# Patient Record
Sex: MALE | Race: WHITE
[De-identification: names, ages, dates, MRNs, and addresses within clinical notes are randomized per-mention and may not be internally consistent; named-entity substitution may affect disease eponyms.]

---

## 2017-10-04 ENCOUNTER — HOSPITAL ENCOUNTER (OUTPATIENT)
Dept: HOSPITAL 84 - D.LABREF | Age: 62
Discharge: HOME | End: 2017-10-04
Attending: ORTHOPAEDIC SURGERY
Payer: MEDICARE

## 2017-10-04 VITALS — BODY MASS INDEX: 39.3 KG/M2

## 2017-10-04 DIAGNOSIS — Z11.8: ICD-10-CM

## 2017-10-04 DIAGNOSIS — M17.11: Primary | ICD-10-CM

## 2017-10-14 ENCOUNTER — HOSPITAL ENCOUNTER (EMERGENCY)
Dept: HOSPITAL 84 - D.ER | Age: 62
LOS: 1 days | Discharge: HOME | End: 2017-10-15
Payer: MEDICARE

## 2017-10-14 VITALS — BODY MASS INDEX: 39.3 KG/M2

## 2017-10-14 DIAGNOSIS — F17.200: ICD-10-CM

## 2017-10-14 DIAGNOSIS — T67.5XXA: Primary | ICD-10-CM

## 2017-10-14 DIAGNOSIS — R42: ICD-10-CM

## 2017-10-14 DIAGNOSIS — I10: ICD-10-CM

## 2017-10-14 DIAGNOSIS — E11.9: ICD-10-CM
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
## 2017-10-14 DIAGNOSIS — H40.9: ICD-10-CM

## 2017-10-14 DIAGNOSIS — Z79.4: ICD-10-CM

## 2017-10-14 DIAGNOSIS — F10.129: ICD-10-CM

## 2017-10-14 DIAGNOSIS — K21.9: ICD-10-CM

## 2017-10-14 DIAGNOSIS — I45.10: ICD-10-CM

## 2017-10-14 LAB
ALBUMIN SERPL-MCNC: 3.9 G/DL (ref 3.4–5)
ALP SERPL-CCNC: 53 U/L (ref 46–116)
ALT SERPL-CCNC: 65 U/L (ref 10–68)
AMPHETAMINES UR QL SCN: NEGATIVE QUAL
ANION GAP SERPL CALC-SCNC: 18.7 MMOL/L (ref 8–16)
APPEARANCE UR: CLEAR
BARBITURATES UR QL SCN: NEGATIVE QUAL
BASOPHILS NFR BLD AUTO: 0.1 % (ref 0–2)
BENZODIAZ UR QL SCN: NEGATIVE QUAL
BILIRUB SERPL-MCNC: 0.26 MG/DL (ref 0.2–1.3)
BILIRUB SERPL-MCNC: NEGATIVE MG/DL
BUN SERPL-MCNC: 10 MG/DL (ref 7–18)
BZE UR QL SCN: NEGATIVE QUAL
CALCIUM SERPL-MCNC: 8.6 MG/DL (ref 8.5–10.1)
CANNABINOIDS UR QL SCN: NEGATIVE QUAL
CHLORIDE SERPL-SCNC: 96 MMOL/L (ref 98–107)
CK MB SERPL-MCNC: 5.9 U/L (ref 0–3.6)
CK SERPL-CCNC: 636 UL (ref 21–232)
CO2 SERPL-SCNC: 25.1 MMOL/L (ref 21–32)
COLOR UR: YELLOW
CREAT SERPL-MCNC: 1 MG/DL (ref 0.6–1.3)
EOSINOPHIL NFR BLD: 0.2 % (ref 0–7)
ERYTHROCYTE [DISTWIDTH] IN BLOOD BY AUTOMATED COUNT: 13.2 % (ref 11.5–14.5)
ETHANOL SERPL-MCNC: 131 MG/DL (ref 0–10)
GLOBULIN SER-MCNC: 3.1 G/L
GLUCOSE SERPL-MCNC: 100 MG/DL
GLUCOSE SERPL-MCNC: 199 MG/DL (ref 74–106)
HCT VFR BLD CALC: 38.5 % (ref 42–54)
HGB BLD-MCNC: 13.2 G/DL (ref 13.5–17.5)
IMM GRANULOCYTES NFR BLD: 0.7 % (ref 0–5)
KETONES UR STRIP-MCNC: (no result) MG/DL
LYMPHOCYTES NFR BLD AUTO: 16.6 % (ref 15–50)
MCH RBC QN AUTO: 31.5 PG (ref 26–34)
MCHC RBC AUTO-ENTMCNC: 34.3 G/DL (ref 31–37)
MCV RBC: 91.9 FL (ref 80–100)
MONOCYTES NFR BLD: 4.5 % (ref 2–11)
NEUTROPHILS NFR BLD AUTO: 77.9 % (ref 40–80)
NITRITE UR-MCNC: NEGATIVE MG/ML
OPIATES UR QL SCN: NEGATIVE QUAL
OSMOLALITY SERPL CALC.SUM OF ELEC: 276 MOSM/KG (ref 275–300)
PCP UR QL SCN: NEGATIVE QUAL
PH UR STRIP: 5 [PH] (ref 5–6)
PLATELET # BLD: 217 10X3/UL (ref 130–400)
PMV BLD AUTO: 9.7 FL (ref 7.4–10.4)
POTASSIUM SERPL-SCNC: 3.8 MMOL/L (ref 3.5–5.1)
PROT SERPL-MCNC: 7 G/DL (ref 6.4–8.2)
PROT UR-MCNC: NEGATIVE MG/DL
RBC # BLD AUTO: 4.19 10X6/UL (ref 4.2–6.1)
SODIUM SERPL-SCNC: 136 MMOL/L (ref 136–145)
SP GR UR STRIP: 1.02 (ref 1–1.02)
TROPONIN I SERPL-MCNC: < 0.017 NG/ML (ref 0–0.06)
UROBILINOGEN UR-MCNC: NORMAL MG/DL
WBC # BLD AUTO: 8.7 10X3/UL (ref 4.8–10.8)

## 2017-11-22 ENCOUNTER — HOSPITAL ENCOUNTER (INPATIENT)
Dept: HOSPITAL 84 - D.SDCHOLD | Age: 62
LOS: 8 days | Discharge: HOME | DRG: 470 | End: 2017-11-30
Attending: ORTHOPAEDIC SURGERY | Admitting: ORTHOPAEDIC SURGERY
Payer: MEDICARE

## 2017-11-22 VITALS — HEIGHT: 68 IN | WEIGHT: 258.54 LBS | BODY MASS INDEX: 39.18 KG/M2

## 2017-11-22 DIAGNOSIS — G47.30: ICD-10-CM

## 2017-11-22 DIAGNOSIS — D62: ICD-10-CM

## 2017-11-22 DIAGNOSIS — M17.11: Primary | ICD-10-CM

## 2017-11-22 DIAGNOSIS — I10: ICD-10-CM

## 2017-11-22 DIAGNOSIS — E66.9: ICD-10-CM

## 2017-11-22 DIAGNOSIS — E11.9: ICD-10-CM

## 2017-11-22 LAB
ANION GAP SERPL CALC-SCNC: 11.8 MMOL/L (ref 8–16)
APPEARANCE UR: CLEAR
APTT BLD: 23.8 SECONDS (ref 22.8–39.4)
BASOPHILS NFR BLD AUTO: 0.2 % (ref 0–2)
BILIRUB SERPL-MCNC: NEGATIVE MG/DL
BUN SERPL-MCNC: 10 MG/DL (ref 7–18)
CALCIUM SERPL-MCNC: 8.9 MG/DL (ref 8.5–10.1)
CHLORIDE SERPL-SCNC: 101 MMOL/L (ref 98–107)
CO2 SERPL-SCNC: 29 MMOL/L (ref 21–32)
COLOR UR: YELLOW
CREAT SERPL-MCNC: 1 MG/DL (ref 0.6–1.3)
EOSINOPHIL NFR BLD: 1.7 % (ref 0–7)
ERYTHROCYTE [DISTWIDTH] IN BLOOD BY AUTOMATED COUNT: 12.5 % (ref 11.5–14.5)
GLUCOSE SERPL-MCNC: 119 MG/DL (ref 74–106)
GLUCOSE SERPL-MCNC: NEGATIVE MG/DL
HCT VFR BLD CALC: 39.3 % (ref 42–54)
HGB BLD-MCNC: 13.2 G/DL (ref 13.5–17.5)
IMM GRANULOCYTES NFR BLD: 0.2 % (ref 0–5)
INR PPP: 0.93 (ref 0.85–1.17)
KETONES UR STRIP-MCNC: NEGATIVE MG/DL
LYMPHOCYTES NFR BLD AUTO: 30.1 % (ref 15–50)
MCH RBC QN AUTO: 31.3 PG (ref 26–34)
MCHC RBC AUTO-ENTMCNC: 33.6 G/DL (ref 31–37)
MCV RBC: 93.1 FL (ref 80–100)
MONOCYTES NFR BLD: 9.8 % (ref 2–11)
NEUTROPHILS NFR BLD AUTO: 58 % (ref 40–80)
NITRITE UR-MCNC: NEGATIVE MG/ML
OSMOLALITY SERPL CALC.SUM OF ELEC: 275 MOSM/KG (ref 275–300)
PH UR STRIP: 5 [PH] (ref 5–6)
PLATELET # BLD: 224 10X3/UL (ref 130–400)
PMV BLD AUTO: 10.1 FL (ref 7.4–10.4)
POTASSIUM SERPL-SCNC: 3.8 MMOL/L (ref 3.5–5.1)
PROT UR-MCNC: NEGATIVE MG/DL
PROTHROMBIN TIME: 12.3 SECONDS (ref 11.6–15)
RBC # BLD AUTO: 4.22 10X6/UL (ref 4.2–6.1)
SODIUM SERPL-SCNC: 138 MMOL/L (ref 136–145)
SP GR UR STRIP: 1.01 (ref 1–1.02)
UROBILINOGEN UR-MCNC: NORMAL MG/DL
WBC # BLD AUTO: 6.5 10X3/UL (ref 4.8–10.8)

## 2017-11-27 VITALS — SYSTOLIC BLOOD PRESSURE: 134 MMHG | DIASTOLIC BLOOD PRESSURE: 72 MMHG

## 2017-11-27 VITALS — DIASTOLIC BLOOD PRESSURE: 59 MMHG | SYSTOLIC BLOOD PRESSURE: 113 MMHG

## 2017-11-27 VITALS — SYSTOLIC BLOOD PRESSURE: 112 MMHG | DIASTOLIC BLOOD PRESSURE: 68 MMHG

## 2017-11-27 VITALS — SYSTOLIC BLOOD PRESSURE: 130 MMHG | DIASTOLIC BLOOD PRESSURE: 90 MMHG

## 2017-11-27 VITALS — DIASTOLIC BLOOD PRESSURE: 70 MMHG | SYSTOLIC BLOOD PRESSURE: 136 MMHG

## 2017-11-27 VITALS — DIASTOLIC BLOOD PRESSURE: 74 MMHG | SYSTOLIC BLOOD PRESSURE: 134 MMHG

## 2017-11-27 VITALS — DIASTOLIC BLOOD PRESSURE: 69 MMHG | SYSTOLIC BLOOD PRESSURE: 118 MMHG

## 2017-11-27 VITALS — DIASTOLIC BLOOD PRESSURE: 67 MMHG | SYSTOLIC BLOOD PRESSURE: 127 MMHG

## 2017-11-27 VITALS — SYSTOLIC BLOOD PRESSURE: 145 MMHG | DIASTOLIC BLOOD PRESSURE: 103 MMHG

## 2017-11-27 VITALS — DIASTOLIC BLOOD PRESSURE: 80 MMHG | SYSTOLIC BLOOD PRESSURE: 121 MMHG

## 2017-11-27 VITALS — DIASTOLIC BLOOD PRESSURE: 65 MMHG | SYSTOLIC BLOOD PRESSURE: 123 MMHG

## 2017-11-27 VITALS — DIASTOLIC BLOOD PRESSURE: 55 MMHG | SYSTOLIC BLOOD PRESSURE: 122 MMHG

## 2017-11-27 VITALS — SYSTOLIC BLOOD PRESSURE: 134 MMHG | DIASTOLIC BLOOD PRESSURE: 84 MMHG

## 2017-11-27 PROCEDURE — 0SRC0J9 REPLACEMENT OF RIGHT KNEE JOINT WITH SYNTHETIC SUBSTITUTE, CEMENTED, OPEN APPROACH: ICD-10-PCS | Performed by: ORTHOPAEDIC SURGERY

## 2017-11-27 NOTE — OP
PATIENT NAME:  GRACE AMZARIEGOS                     MEDICAL RECORD: [id number]
:[birth year]                                             LOCATION:[id number]
                                                         ADMISSION DATE:17
SURGEON:  CHARLEY PACHECO MD        
 
 
DATE OF OPERATION:  2017
 
DATE OF OPERATION:  2017
 
PREOPERATIVE DIAGNOSIS:  Degenerative arthritis, right knee.
 
POSTOPERATIVE DIAGNOSIS:  Degenerative arthritis, right knee.
 
PROCEDURE:  Right total knee arthroplasty.
 
SURGEON:  Charley Pacheco MD
 
ANESTHESIA:  General.
 
INTRAOPERATIVE COMPLICATIONS:  None.
 
SUMMARY OF PATHOLOGIC FINDINGS:  The patient had extensive tricompartmental
osteoarthritis consistent with preoperative radiographs.
 
IMPLANTS USED:  Arthrex iBalance total knee arthroplasty system, size 7 distal
femur, size 6 tibial baseplate, size 6 x 9 mm polyethylene insert, size 34 x 9
patella.
 
ESTIMATED BLOOD LOSS:  50 cc.
 
OPERATIVE SUMMARY IN DETAIL:  After obtaining the appropriate preoperative
orthopedic surgery consent as well as anesthetic consultation, evaluation and
clearance, the patient was brought to the operating room and placed on the
operating table in supine position.  After general laryngeal mask was
administered, tourniquet was placed about the proximal aspect of the right lower
extremity.  Right lower extremity was prepped and draped in routine sterile
fashion.  Leg was elevated, exsanguinated and tourniquet inflated to 350 mmHg. 
Routine midline incision was taken down for paramedian arthrotomy.  Patella was
everted, distal femur was exposed.  Soft tissue excision was done in the usual
fashion.  Distal intramedullary guide hole was created for distal intramedullary
guided cut followed by resection of the distal femur and the entire proximal
tibia was exposed.  Further soft tissue resection was then followed by creation
of an intramedullary guide hole for proximal tibial intramedullary guided cut. 
Having completed this cut, trials were put into place, taken through range of
motion, thus corresponding to the above-mentioned final components.  Distal
femoral preparations were made followed by proximal tibial preparations, the
patellar articular surface was excised and prepared for final patellar
insertion.  At this point, the knee was copiously irrigated in pulsatile lavage
fashion.  At this point, the bone ends were dried.  The tibial baseplate was
cemented into place first.  Removal of all cement was then followed by placement
of the polyethylene.  Distal femur was then put into place with removal of all
cement and finally the patella was put into place again with removal of all
cement.  The knee was held in extension with a patellar clamp in place while the
cement was allowed to harden.  Having completed this, the knee was taken through
range of motion and found to be stable in all planes.  Paramedian arthrotomy was
closed with #2 Ethibond followed by #1 Vicryl, 2-0 Vicryl and skin staples. 
Sterile dressings were applied.  The tourniquet was deflated.  The patient was
 
 
 
OPERATIVE REPORT                               [id number]    GRACE MAZARIEGOS   
 
 
awakened, taken to recovery room in stable condition.  All final needle and
sponge counts were correct.
 
TRANSINT:[id number] Voice Confirmation ID: [id number] DOCUMENT ID: [id number]
                                           
                                           JUNIOR CARO, CHARLEY CAROLINA        
 
 
 
Electronically Signed by CHARLEY PACHECO MD on 17 at 1318
 
 
 
 
 
 
 
 
 
 
 
 
 
 
 
 
 
 
 
 
 
 
 
 
 
 
 
 
 
 
 
 
 
 
 
 
 
CC:                                                             [id number]
DICTATION DATE: 1752     :     17 1240      ADM IN  
                                                                              
[id number] [place] [address]

## 2017-11-27 NOTE — NUR
FSBS 132. NO COVERAGE REQUIRED. LUNCH SERVED IN ROOM. INSTRUCTED IN USE OF IS
WITH RETURN DEMONSTRATION. DENIES PAIN OR NEEDS AT THIS TIME.

## 2017-11-27 NOTE — NUR
RECEIVED TO ROOM 2209 VIA BED FROM PACU. A/O X3. WIFE IN ROOM. DRESSING TO
RIGHT KNEE DRY AND INTACT. NO C/O PAIN OR DISCOMFORT AT THIS TIME. DENIES
NEEDS.

## 2017-11-28 VITALS — DIASTOLIC BLOOD PRESSURE: 56 MMHG | SYSTOLIC BLOOD PRESSURE: 130 MMHG

## 2017-11-28 VITALS — DIASTOLIC BLOOD PRESSURE: 67 MMHG | SYSTOLIC BLOOD PRESSURE: 136 MMHG

## 2017-11-28 VITALS — SYSTOLIC BLOOD PRESSURE: 151 MMHG | DIASTOLIC BLOOD PRESSURE: 71 MMHG

## 2017-11-28 VITALS — SYSTOLIC BLOOD PRESSURE: 134 MMHG | DIASTOLIC BLOOD PRESSURE: 77 MMHG

## 2017-11-28 VITALS — SYSTOLIC BLOOD PRESSURE: 125 MMHG | DIASTOLIC BLOOD PRESSURE: 70 MMHG

## 2017-11-28 LAB
ERYTHROCYTE [DISTWIDTH] IN BLOOD BY AUTOMATED COUNT: 12.5 % (ref 11.5–14.5)
HCT VFR BLD CALC: 33.4 % (ref 42–54)
HGB BLD-MCNC: 11.3 G/DL (ref 13.5–17.5)
MCH RBC QN AUTO: 31.4 PG (ref 26–34)
MCHC RBC AUTO-ENTMCNC: 33.8 G/DL (ref 31–37)
MCV RBC: 92.8 FL (ref 80–100)
PLATELET # BLD: 203 10X3/UL (ref 130–400)
PMV BLD AUTO: 10 FL (ref 7.4–10.4)
RBC # BLD AUTO: 3.6 10X6/UL (ref 4.2–6.1)
WBC # BLD AUTO: 7.9 10X3/UL (ref 4.8–10.8)

## 2017-11-28 NOTE — NUR
PATIENTS FSBS 280. GIVEN 6 UNITS SUBQ PER SS. PATIENT HAS HOME INSULIN WITH
HIM AND STATED HE WOULD TAKE SOME MORE AFTER SUPPER. ENCOURAGED NOT TO DO SO.
WILL MONITOR.

## 2017-11-28 NOTE — NUR
AWAKE AND ALERT. ORIENTED X3. NO C/O AT THIS TIME. SITTING UP IN BED EATING
BREAKFAST. CPM REMOVED AT THIS TIME. LUNGS ARE CLEAR BILATERALLY, NO COUGH
NOTED. SKIN IS INTACT WITHOUT REDNESS EXCEPT INCISION TO RIGHT KNEE WHICH HAS
A DRY INTACT DRESSING IN PLACE. IV TO LEFT FOREARM IS PATENT WITHOUT REDNESS
AT INSERTION SITE. DENIES NEEDS. REPORTS VERY LITTLE RELIER WITH USE OF
HYDROCODONE.

## 2017-11-28 NOTE — NUR
1930)REC;D. IN BED IN ERECT SITTING POSITION PLAYNG TABLET. ACEWRAP DRESSING
DRY AND INTACT TO RT. LEG IN CPM AT PRESENT TIME. FOOT WARM STATES LEG REMAINS
NUMB. CAN FEEL BACK SIDE OF CALF. IN CPM SUZANNE. WELL. WILL CONTINUE TO MOMITOR
FOR ANY CHGES. AND FOLLOW CURRENT PLAN OF CARE.

## 2017-11-29 VITALS — DIASTOLIC BLOOD PRESSURE: 60 MMHG | SYSTOLIC BLOOD PRESSURE: 137 MMHG

## 2017-11-29 VITALS — SYSTOLIC BLOOD PRESSURE: 123 MMHG | DIASTOLIC BLOOD PRESSURE: 73 MMHG

## 2017-11-29 VITALS — DIASTOLIC BLOOD PRESSURE: 55 MMHG | SYSTOLIC BLOOD PRESSURE: 126 MMHG

## 2017-11-29 VITALS — SYSTOLIC BLOOD PRESSURE: 138 MMHG | DIASTOLIC BLOOD PRESSURE: 70 MMHG

## 2017-11-29 LAB
ERYTHROCYTE [DISTWIDTH] IN BLOOD BY AUTOMATED COUNT: 12.2 % (ref 11.5–14.5)
HCT VFR BLD CALC: 29.5 % (ref 42–54)
HGB BLD-MCNC: 10.2 G/DL (ref 13.5–17.5)
MCH RBC QN AUTO: 31.6 PG (ref 26–34)
MCHC RBC AUTO-ENTMCNC: 34.6 G/DL (ref 31–37)
MCV RBC: 91.3 FL (ref 80–100)
PLATELET # BLD: 173 10X3/UL (ref 130–400)
PMV BLD AUTO: 9.7 FL (ref 7.4–10.4)
RBC # BLD AUTO: 3.23 10X6/UL (ref 4.2–6.1)
WBC # BLD AUTO: 7.8 10X3/UL (ref 4.8–10.8)

## 2017-11-29 NOTE — NUR
PT CHECKING OWN BLOOD SUGAR AND ADMINISTERING INSULIN FROM HOME. PT ADVISED TO
LET NURSE CHECK AND ADMINISTER INSULIN.

## 2017-11-30 VITALS — DIASTOLIC BLOOD PRESSURE: 73 MMHG | SYSTOLIC BLOOD PRESSURE: 136 MMHG

## 2017-11-30 VITALS — DIASTOLIC BLOOD PRESSURE: 62 MMHG | SYSTOLIC BLOOD PRESSURE: 130 MMHG

## 2017-11-30 NOTE — NUR
DISCHARGE INSTRUCTIONS GIVEN TO PT AS ORDERED, VERBALIZED UNDERSTANDING AND
SIGNED. PT INFORMATION AND DRSG SENT WITH PT TO CHANGE AS ORDERED.
L FOREARM IV D/C'D AT THIS TIME, BLEED CONTROL, BANDAGE APPLIED. NO NEEDS
VOICED AT THIS TIME.

## 2017-11-30 NOTE — NUR
REC'D.IN BED CPM ON STATES WANTS PAIN PILL WHEN CPM REMOVED AT 2130.ACEWRAP
DRSG DRY AND INTACT TO LEFT LEG.FOOT PINK AND WARM/PEDAL PULSE PRESENT/WIGGLE
TOES DORSIFLEXES,DENIES CALF PAIN OR TENDERNESS WILL CONTONUE TO MONITOR FOR
ANY CHGES. AND FOLLOW CURRENT PLAN OF CARE.

## 2017-11-30 NOTE — NUR
PT RESTING QUIETLY. DENIES ANY NEEDS AT THIS TIME. STATES HE IS READY TO GET
SOME SLEEP. LIGHTS OFF, CALL LIGHT IN REACH. WILL CONTINUE WITH PLAN OF CARE.

## 2017-11-30 NOTE — NUR
Patient being discharged today his son will pick him up. He has a walker at
bedside. OP PT set up at [facility] and CPM machine will be delivered to his home.
CM will continue to follow and assist with discharge planning needs.

## 2017-12-11 ENCOUNTER — HOSPITAL ENCOUNTER (OUTPATIENT)
Dept: HOSPITAL 84 - D.US | Age: 62
Discharge: HOME | End: 2017-12-11
Attending: ORTHOPAEDIC SURGERY
Payer: MEDICARE

## 2017-12-11 VITALS — BODY MASS INDEX: 39.3 KG/M2

## 2017-12-11 DIAGNOSIS — M79.604: Primary | ICD-10-CM

## 2017-12-11 DIAGNOSIS — R22.41: ICD-10-CM

## 2017-12-18 ENCOUNTER — HOSPITAL ENCOUNTER (OUTPATIENT)
Dept: HOSPITAL 84 - D.CT | Age: 62
Discharge: HOME | End: 2017-12-18
Attending: ORTHOPAEDIC SURGERY
Payer: MEDICARE

## 2017-12-18 VITALS — BODY MASS INDEX: 39.3 KG/M2

## 2017-12-18 DIAGNOSIS — R22.41: Primary | ICD-10-CM

## 2018-01-29 ENCOUNTER — HOSPITAL ENCOUNTER (OUTPATIENT)
Dept: HOSPITAL 84 - D.MRI | Age: 63
Discharge: HOME | End: 2018-01-29
Attending: ORTHOPAEDIC SURGERY
Payer: MEDICARE

## 2018-01-29 VITALS — BODY MASS INDEX: 39.3 KG/M2

## 2018-01-29 DIAGNOSIS — M25.511: Primary | ICD-10-CM

## 2018-03-12 ENCOUNTER — HOSPITAL ENCOUNTER (OUTPATIENT)
Dept: HOSPITAL 84 - D.OPS | Age: 63
Discharge: HOME | End: 2018-03-12
Attending: ORTHOPAEDIC SURGERY
Payer: MEDICARE

## 2018-03-12 VITALS
WEIGHT: 231 LBS | BODY MASS INDEX: 35.01 KG/M2 | BODY MASS INDEX: 35.01 KG/M2 | HEIGHT: 68 IN | HEIGHT: 68 IN | WEIGHT: 231 LBS

## 2018-03-12 VITALS — SYSTOLIC BLOOD PRESSURE: 116 MMHG | DIASTOLIC BLOOD PRESSURE: 71 MMHG

## 2018-03-12 DIAGNOSIS — K21.9: ICD-10-CM

## 2018-03-12 DIAGNOSIS — E11.9: ICD-10-CM

## 2018-03-12 DIAGNOSIS — Z01.812: ICD-10-CM

## 2018-03-12 DIAGNOSIS — G47.30: ICD-10-CM

## 2018-03-12 DIAGNOSIS — M25.661: Primary | ICD-10-CM

## 2018-03-12 DIAGNOSIS — Z96.651: ICD-10-CM

## 2018-03-12 LAB
ANION GAP SERPL CALC-SCNC: 11.9 MMOL/L (ref 8–16)
BUN SERPL-MCNC: 14 MG/DL (ref 7–18)
CALCIUM SERPL-MCNC: 9 MG/DL (ref 8.5–10.1)
CHLORIDE SERPL-SCNC: 98 MMOL/L (ref 98–107)
CO2 SERPL-SCNC: 31.1 MMOL/L (ref 21–32)
CREAT SERPL-MCNC: 1.1 MG/DL (ref 0.6–1.3)
ERYTHROCYTE [DISTWIDTH] IN BLOOD BY AUTOMATED COUNT: 13.5 % (ref 11.5–14.5)
GLUCOSE SERPL-MCNC: 175 MG/DL (ref 74–106)
HCT VFR BLD CALC: 38.1 % (ref 42–54)
HGB BLD-MCNC: 12.6 G/DL (ref 13.5–17.5)
MCH RBC QN AUTO: 28.9 PG (ref 26–34)
MCHC RBC AUTO-ENTMCNC: 33.1 G/DL (ref 31–37)
MCV RBC: 87.4 FL (ref 80–100)
OSMOLALITY SERPL CALC.SUM OF ELEC: 278 MOSM/KG (ref 275–300)
PLATELET # BLD: 243 10X3/UL (ref 130–400)
PMV BLD AUTO: 9.4 FL (ref 7.4–10.4)
POTASSIUM SERPL-SCNC: 4 MMOL/L (ref 3.5–5.1)
RBC # BLD AUTO: 4.36 10X6/UL (ref 4.2–6.1)
SODIUM SERPL-SCNC: 137 MMOL/L (ref 136–145)
WBC # BLD AUTO: 8.5 10X3/UL (ref 4.8–10.8)

## 2018-07-30 ENCOUNTER — HOSPITAL ENCOUNTER (EMERGENCY)
Dept: HOSPITAL 84 - D.ER | Age: 63
LOS: 1 days | Discharge: HOME | End: 2018-07-31
Payer: MEDICARE

## 2018-07-30 VITALS — WEIGHT: 241.51 LBS | BODY MASS INDEX: 36.6 KG/M2 | HEIGHT: 68 IN

## 2018-07-30 DIAGNOSIS — S40.011A: Primary | ICD-10-CM
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
## 2018-07-30 DIAGNOSIS — E11.9: ICD-10-CM

## 2018-07-30 DIAGNOSIS — I10: ICD-10-CM
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
## 2018-07-30 DIAGNOSIS — F17.200: ICD-10-CM

## 2018-07-31 VITALS — SYSTOLIC BLOOD PRESSURE: 129 MMHG | DIASTOLIC BLOOD PRESSURE: 82 MMHG

## 2018-08-01 ENCOUNTER — HOSPITAL ENCOUNTER (OUTPATIENT)
Dept: HOSPITAL 84 - D.MRI | Age: 63
Discharge: HOME | End: 2018-08-01
Attending: ORTHOPAEDIC SURGERY
Payer: MEDICARE

## 2018-08-01 VITALS — BODY MASS INDEX: 36.7 KG/M2

## 2018-08-01 DIAGNOSIS — M75.121: Primary | ICD-10-CM

## 2018-08-22 LAB
ANION GAP SERPL CALC-SCNC: 9.3 MMOL/L (ref 8–16)
BUN SERPL-MCNC: 11 MG/DL (ref 7–18)
CALCIUM SERPL-MCNC: 9 MG/DL (ref 8.5–10.1)
CHLORIDE SERPL-SCNC: 99 MMOL/L (ref 98–107)
CO2 SERPL-SCNC: 33.3 MMOL/L (ref 21–32)
CREAT SERPL-MCNC: 1.1 MG/DL (ref 0.6–1.3)
ERYTHROCYTE [DISTWIDTH] IN BLOOD BY AUTOMATED COUNT: 12.5 % (ref 11.5–14.5)
GLUCOSE SERPL-MCNC: 75 MG/DL (ref 74–106)
HCT VFR BLD CALC: 39.7 % (ref 42–54)
HGB BLD-MCNC: 13.8 G/DL (ref 13.5–17.5)
MCH RBC QN AUTO: 31.6 PG (ref 26–34)
MCHC RBC AUTO-ENTMCNC: 34.8 G/DL (ref 31–37)
MCV RBC: 90.8 FL (ref 80–100)
OSMOLALITY SERPL CALC.SUM OF ELEC: 273 MOSM/KG (ref 275–300)
PLATELET # BLD: 241 10X3/UL (ref 130–400)
PMV BLD AUTO: 9.3 FL (ref 7.4–10.4)
POTASSIUM SERPL-SCNC: 3.6 MMOL/L (ref 3.5–5.1)
RBC # BLD AUTO: 4.37 10X6/UL (ref 4.2–6.1)
SODIUM SERPL-SCNC: 138 MMOL/L (ref 136–145)
WBC # BLD AUTO: 8.1 10X3/UL (ref 4.8–10.8)

## 2018-08-23 ENCOUNTER — HOSPITAL ENCOUNTER (EMERGENCY)
Dept: HOSPITAL 84 - D.ER | Age: 63
Discharge: HOME | End: 2018-08-23
Payer: MEDICARE

## 2018-08-23 ENCOUNTER — HOSPITAL ENCOUNTER (OUTPATIENT)
Dept: HOSPITAL 84 - D.OPS | Age: 63
Discharge: HOME | End: 2018-08-23
Attending: ORTHOPAEDIC SURGERY
Payer: MEDICARE

## 2018-08-23 VITALS
HEIGHT: 68 IN | BODY MASS INDEX: 36.53 KG/M2 | WEIGHT: 241 LBS | WEIGHT: 241 LBS | BODY MASS INDEX: 36.53 KG/M2 | HEIGHT: 68 IN

## 2018-08-23 VITALS — DIASTOLIC BLOOD PRESSURE: 77 MMHG | SYSTOLIC BLOOD PRESSURE: 148 MMHG

## 2018-08-23 VITALS
BODY MASS INDEX: 37.06 KG/M2 | WEIGHT: 244.51 LBS | BODY MASS INDEX: 37.06 KG/M2 | HEIGHT: 68 IN | HEIGHT: 68 IN | WEIGHT: 244.51 LBS

## 2018-08-23 VITALS — SYSTOLIC BLOOD PRESSURE: 136 MMHG | DIASTOLIC BLOOD PRESSURE: 87 MMHG

## 2018-08-23 DIAGNOSIS — F17.200: ICD-10-CM

## 2018-08-23 DIAGNOSIS — M75.111: ICD-10-CM

## 2018-08-23 DIAGNOSIS — J98.01: Primary | ICD-10-CM

## 2018-08-23 DIAGNOSIS — M75.41: Primary | ICD-10-CM

## 2018-08-23 DIAGNOSIS — Z01.812: ICD-10-CM

## 2018-08-23 DIAGNOSIS — E11.9: ICD-10-CM

## 2018-08-23 DIAGNOSIS — K21.9: ICD-10-CM

## 2018-08-23 DIAGNOSIS — M94.211: ICD-10-CM

## 2018-08-23 DIAGNOSIS — I10: ICD-10-CM

## 2018-08-23 LAB
ALBUMIN SERPL-MCNC: 3.3 G/DL (ref 3.4–5)
ALP SERPL-CCNC: 60 U/L (ref 46–116)
ALT SERPL-CCNC: 39 U/L (ref 10–68)
ANION GAP SERPL CALC-SCNC: 13.9 MMOL/L (ref 8–16)
BASOPHILS NFR BLD AUTO: 0.1 % (ref 0–2)
BILIRUB SERPL-MCNC: 0.35 MG/DL (ref 0.2–1.3)
BUN SERPL-MCNC: 14 MG/DL (ref 7–18)
CALCIUM SERPL-MCNC: 7.6 MG/DL (ref 8.5–10.1)
CHLORIDE SERPL-SCNC: 99 MMOL/L (ref 98–107)
CK MB SERPL-MCNC: 4.4 U/L (ref 0–3.6)
CK SERPL-CCNC: 442 UL (ref 21–232)
CO2 SERPL-SCNC: 25 MMOL/L (ref 21–32)
CREAT SERPL-MCNC: 1.3 MG/DL (ref 0.6–1.3)
EOSINOPHIL NFR BLD: 0.9 % (ref 0–7)
ERYTHROCYTE [DISTWIDTH] IN BLOOD BY AUTOMATED COUNT: 12.6 % (ref 11.5–14.5)
GLOBULIN SER-MCNC: 3.2 G/L
GLUCOSE SERPL-MCNC: 347 MG/DL (ref 74–106)
HCT VFR BLD CALC: 35.8 % (ref 42–54)
HGB BLD-MCNC: 12.3 G/DL (ref 13.5–17.5)
IMM GRANULOCYTES NFR BLD: 0.6 % (ref 0–5)
LYMPHOCYTES NFR BLD AUTO: 20 % (ref 15–50)
MCH RBC QN AUTO: 31.5 PG (ref 26–34)
MCHC RBC AUTO-ENTMCNC: 34.4 G/DL (ref 31–37)
MCV RBC: 91.6 FL (ref 80–100)
MONOCYTES NFR BLD: 8.6 % (ref 2–11)
NEUTROPHILS NFR BLD AUTO: 69.8 % (ref 40–80)
OSMOLALITY SERPL CALC.SUM OF ELEC: 282 MOSM/KG (ref 275–300)
PLATELET # BLD: 227 10X3/UL (ref 130–400)
PMV BLD AUTO: 9.7 FL (ref 7.4–10.4)
POTASSIUM SERPL-SCNC: 3.9 MMOL/L (ref 3.5–5.1)
PROT SERPL-MCNC: 6.5 G/DL (ref 6.4–8.2)
RBC # BLD AUTO: 3.91 10X6/UL (ref 4.2–6.1)
SODIUM SERPL-SCNC: 134 MMOL/L (ref 136–145)
TROPONIN I SERPL-MCNC: < 0.017 NG/ML (ref 0–0.06)
WBC # BLD AUTO: 10.8 10X3/UL (ref 4.8–10.8)

## 2018-10-03 ENCOUNTER — HOSPITAL ENCOUNTER (OUTPATIENT)
Dept: HOSPITAL 84 - D.US | Age: 63
Discharge: HOME | End: 2018-10-03
Attending: SURGERY
Payer: MEDICARE

## 2018-10-03 VITALS — BODY MASS INDEX: 37.1 KG/M2

## 2018-10-03 DIAGNOSIS — I83.893: Primary | ICD-10-CM

## 2018-12-13 LAB
ANION GAP SERPL CALC-SCNC: 15.2 MMOL/L (ref 8–16)
BUN SERPL-MCNC: 9 MG/DL (ref 7–18)
CALCIUM SERPL-MCNC: 8.7 MG/DL (ref 8.5–10.1)
CHLORIDE SERPL-SCNC: 99 MMOL/L (ref 98–107)
CO2 SERPL-SCNC: 27.5 MMOL/L (ref 21–32)
CREAT SERPL-MCNC: 1 MG/DL (ref 0.6–1.3)
ERYTHROCYTE [DISTWIDTH] IN BLOOD BY AUTOMATED COUNT: 12.4 % (ref 11.5–14.5)
GLUCOSE SERPL-MCNC: 116 MG/DL (ref 74–106)
HCT VFR BLD CALC: 38.2 % (ref 42–54)
HGB BLD-MCNC: 13.3 G/DL (ref 13.5–17.5)
MCH RBC QN AUTO: 31.1 PG (ref 26–34)
MCHC RBC AUTO-ENTMCNC: 34.8 G/DL (ref 31–37)
MCV RBC: 89.5 FL (ref 80–100)
OSMOLALITY SERPL CALC.SUM OF ELEC: 275 MOSM/KG (ref 275–300)
PLATELET # BLD: 203 10X3/UL (ref 130–400)
PMV BLD AUTO: 9.6 FL (ref 7.4–10.4)
POTASSIUM SERPL-SCNC: 3.7 MMOL/L (ref 3.5–5.1)
RBC # BLD AUTO: 4.27 10X6/UL (ref 4.2–6.1)
SODIUM SERPL-SCNC: 138 MMOL/L (ref 136–145)
WBC # BLD AUTO: 5.8 10X3/UL (ref 4.8–10.8)

## 2018-12-14 ENCOUNTER — HOSPITAL ENCOUNTER (OUTPATIENT)
Dept: HOSPITAL 84 - D.OPS | Age: 63
LOS: 1 days | Discharge: HOME | End: 2018-12-15
Attending: SURGERY
Payer: MEDICARE

## 2018-12-14 VITALS — DIASTOLIC BLOOD PRESSURE: 74 MMHG | SYSTOLIC BLOOD PRESSURE: 128 MMHG

## 2018-12-14 VITALS — HEIGHT: 78 IN | BODY MASS INDEX: 28.92 KG/M2 | WEIGHT: 249.92 LBS

## 2018-12-14 DIAGNOSIS — I83.93: Primary | ICD-10-CM

## 2018-12-14 DIAGNOSIS — E11.9: ICD-10-CM

## 2018-12-14 DIAGNOSIS — Z01.812: ICD-10-CM

## 2018-12-14 DIAGNOSIS — I87.2: ICD-10-CM

## 2018-12-14 NOTE — NUR
2338: PATIENT HAS BEEN TURNED TO PRONE POSITION, BILATERAL LEGS
RE-PREPPED USING HIBICLEANSE, AND REDRAPED.

## 2018-12-14 NOTE — NUR
1545  BS 98  PT STATES HE [place]ES [place]  [place] AND STARTS FEELING BAD WHEN
HIS BS DROPS BELOW 90.  SPOKE WITH DR ROSE AND RECEIVED A VERBAL
ORDER FOR 1/2
D50W

## 2018-12-15 VITALS — DIASTOLIC BLOOD PRESSURE: 68 MMHG | SYSTOLIC BLOOD PRESSURE: 112 MMHG

## 2018-12-15 VITALS — SYSTOLIC BLOOD PRESSURE: 126 MMHG | DIASTOLIC BLOOD PRESSURE: 76 MMHG

## 2018-12-15 VITALS — DIASTOLIC BLOOD PRESSURE: 67 MMHG | SYSTOLIC BLOOD PRESSURE: 133 MMHG

## 2018-12-15 NOTE — NUR
PATIENT IV REMOVED WITH CATH TIP INTACT. STATED NO LONGER USING PAIN PUMP AT
THIS TIME. PATIENT TO BED DISCHARGED. DRESSINGS TO BLE CDI. FAMILY AT BEDSIDE.
CALL LIGHT WITHIN REACH.

## 2018-12-15 NOTE — NUR
PATIENT RECIEVED DC INSTRUCTIONS AND PRESCRIPTIONS. VERBALIZED UNDERSTANDING.
NO QUESTIONS AT THIS TIME. AMBULATED OUT OF HOSPITAL WITH PERSONAL BELONGINGS
ESCORTED BY WIFE. REFUSED WC AT THIS TIME.

## 2018-12-18 NOTE — OP
PATIENT NAME:  GRACE MAZARIEGOS                     MEDICAL RECORD: [id number]
:[birth year]                                             LOCATION:[id number]          
                                                         ADMISSION DATE:        
SURGEON:  ESTEFANY ROSE MD            
 
 
DATE OF OPERATION:  2018
 
PREOPERATIVE DIAGNOSES:
1.  Symptomatic bilateral lower extremity varicosities.
2.  Venous aneurysm.
3.  Pathologic greater saphenous venous reflux, left.
4.  Pathologic bilateral lesser saphenous venous reflux.
 
POSTOPERATIVE DIAGNOSES:
1.  Symptomatic bilateral lower extremity varicosities.
2.  Venous aneurysm.
3.  Pathologic greater saphenous venous reflux, left.
4.  Pathologic bilateral lesser saphenous venous reflux.
 
PROCEDURES: 
1.  VNUS radiofrequency ablation of the left greater saphenous vein.  The length
of vein treated was 59 cm.
2.  VNUS radiofrequency ablation of the right lesser saphenous vein.  The length
of vein treated was 24 cm.
3.  Avulsion phlebectomies times 35 of the bilateral lower extremities.
 
SURGEON:  Estefany Rose MD
 
ASSISTANT:  None.
 
BLOOD LOSS:  200 cc.
 
ANESTHESIA:  General.
 
COMPLICATIONS:  None.
 
I reviewed the patient's venous reflux examination prior to the procedure.  I
also marked the patient's varicose veins with him standing.  He confirmed that
all the varicose veins had been marked by my magic marker.
 
OPERATIVE COURSE:  The patient was conveyed to the operating room electively on
2018.  General anesthesia was induced by the anesthesia staff.  The
patient was positioned supine.  I interrogated the left greater saphenous vein
at the ankle.  This was percutaneously accessed in an antegrade fashion.  A
guidewire passed easily.  A small skin nick was accomplished.  A 7-Chinese
dilator sheath was advanced.  The dilator and wire were removed.  Through the
sheath, I advanced the radiofrequency catheter.  Under ultrasonographic
guidance, it was advanced to the saphenofemoral junction.  I then withdrew it 2
cm into the greater saphenous vein.
 
I then positioned the patient in the Trendelenburg position.  Under ultrasound,
I confirmed that the tip of the radiofrequency catheter had not moved.  Under
ultrasonographic guidance, I then injected a crystalloid solution around the
vein to act as a heat sink to prevent damage to nearby structures such as
nerves.  The radiofrequency catheter was activated twice while holding pressure
against it from the outside.  With each 7 cm pullback, it was activated again. 
I then removed the radiofrequency catheter.  The length of the vein treated is
 
 
 
OPERATIVE REPORT                               [id number]    GRACE MAZARIEGOS   
 
 
listed above.
 
I then positioned the patient in the reverse Trendelenburg position.  I held
pressure over the saphenofemoral junction.  I then injected a foamed sclerosant.
 I kept pressure over the greater saphenous vein in order to keep it collapsed
so that the foamed sclerosant would have a longer contact time with the wall of
the greater saphenous vein.
 
The sheath was removed.  The puncture site was closed with a single 4-0 Vicryl
Rapide suture.
 
With the patient still supine, but in the Trendelenburg position, small skin
nicks were accomplished over the varicose veins.  Portions of the varicose veins
were removed utilizing the avulsion phlebectomy technique with a phlebectomy
hook.  At no time was there any apparent nervous injury. 
 
The patient was then flipped over and placed prone.  Both lower extremities were
sterilely prepped and draped.  I interrogated the left lesser saphenous vein and
it was small and I was able to inject it with a foamed sclerosant which appeared
to have done the job with regard to getting the vein to ablate.  I held pressure
over the saphenopopliteal junction after injecting the foamed sclerosant.  On
the right side, the lesser saphenous vein was larger and compressible.  I was
able to percutaneously access it in an antegrade fashion.  A guidewire passed
easily.  A small skin nick was accomplished.  A 7-Chinese dilator sheath was
advanced.  The dilator and wire were removed.  Through the sheath, I advanced a
radiofrequency catheter.  I advanced the tip to the saphenopopliteal junction
and then withdrew it about 2 cm into the lesser saphenous vein.  Under
ultrasonographic guidance, I injected a crystalloid solution into the
perivenular tissues to act as a heat sink to prevent damage to surrounding
structures.
 
I then positioned the patient in Trendelenburg position.  I confirmed with
ultrasound that the tip of the catheter had not moved.  The catheter was
activated with pressure held against the vein from outside on the patient's
skin.  With each 7 cm pullback, it was activated again.  The radiofrequency
catheter was removed.  Through the sheath, I injected a foamed sclerosant and I
compressed at the saphenopopliteal junction to keep the foamed sclerosant from
going into the deep venous system and to prolong the amount of contact time
between the foamed sclerosant and the lesser saphenous vein.
 
The remaining varicosities were noted due to their markings.  Small skin nicks
were accomplished.  Through the skin nicks, avulsion phlebectomies were
performed.
 
The sheath puncture site was closed with a single 4-0 Vicryl Rapide suture. 
Sterile dressings were applied including a circumferential Coban.  The patient
was then extubated and conveyed to post-anesthesia care unit where he was in
stable condition.
 
TRANSINT:[id number] Voice Confirmation ID: [id number] DOCUMENT ID: [id number]
2018 Edited for transcription error, dmdi.
 
 
 
OPERATIVE REPORT                               [id number]    GRACE MAZARIEGOS, ESTEFANY CARO            
 
 
 
Electronically Signed by ESTEFANY ROSE on 18 at 1522
 
 
 
 
 
 
 
 
 
 
 
 
 
 
 
 
 
 
 
 
 
 
 
 
 
 
 
 
 
 
 
 
 
 
 
 
 
 
 
 
 
CC: RADHA ARCHULETA DO                                         [id number]
DICTATION DATE: 12/15/18 035     :     12/15/18 0531      [facility] 
                                                                      12/15/18
[facility]                                          
[address] [place] [address]

## 2019-03-28 ENCOUNTER — HOSPITAL ENCOUNTER (EMERGENCY)
Dept: HOSPITAL 84 - D.ER | Age: 64
Discharge: HOME | End: 2019-03-28
Payer: MEDICARE

## 2019-03-28 VITALS
HEIGHT: 78 IN | HEIGHT: 78 IN | WEIGHT: 260 LBS | BODY MASS INDEX: 30.08 KG/M2 | WEIGHT: 260 LBS | BODY MASS INDEX: 30.08 KG/M2

## 2019-03-28 VITALS — SYSTOLIC BLOOD PRESSURE: 140 MMHG | DIASTOLIC BLOOD PRESSURE: 92 MMHG

## 2019-03-28 DIAGNOSIS — I10: ICD-10-CM

## 2019-03-28 DIAGNOSIS — E78.5: ICD-10-CM

## 2019-03-28 DIAGNOSIS — K92.2: Primary | ICD-10-CM

## 2019-03-28 DIAGNOSIS — E11.40: ICD-10-CM

## 2019-03-28 DIAGNOSIS — K21.9: ICD-10-CM

## 2019-03-28 DIAGNOSIS — F17.220: ICD-10-CM

## 2019-03-28 DIAGNOSIS — K76.0: ICD-10-CM

## 2019-03-28 LAB
ALBUMIN SERPL-MCNC: 3.5 G/DL (ref 3.4–5)
ALP SERPL-CCNC: 57 U/L (ref 46–116)
ALT SERPL-CCNC: 79 U/L (ref 10–68)
ANION GAP SERPL CALC-SCNC: 12.6 MMOL/L (ref 8–16)
BASOPHILS NFR BLD AUTO: 0.3 % (ref 0–2)
BILIRUB SERPL-MCNC: 0.3 MG/DL (ref 0.2–1.3)
BUN SERPL-MCNC: 7 MG/DL (ref 7–18)
CALCIUM SERPL-MCNC: 8.8 MG/DL (ref 8.5–10.1)
CHLORIDE SERPL-SCNC: 98 MMOL/L (ref 98–107)
CO2 SERPL-SCNC: 30.6 MMOL/L (ref 21–32)
CREAT SERPL-MCNC: 1 MG/DL (ref 0.6–1.3)
EOSINOPHIL NFR BLD: 1.4 % (ref 0–7)
ERYTHROCYTE [DISTWIDTH] IN BLOOD BY AUTOMATED COUNT: 12.9 % (ref 11.5–14.5)
GLOBULIN SER-MCNC: 3.6 G/L
GLUCOSE SERPL-MCNC: 205 MG/DL (ref 74–106)
HCT VFR BLD CALC: 38 % (ref 42–54)
HGB BLD-MCNC: 12.9 G/DL (ref 13.5–17.5)
IMM GRANULOCYTES NFR BLD: 0.5 % (ref 0–5)
LYMPHOCYTES NFR BLD AUTO: 38.4 % (ref 15–50)
MCH RBC QN AUTO: 31.2 PG (ref 26–34)
MCHC RBC AUTO-ENTMCNC: 33.9 G/DL (ref 31–37)
MCV RBC: 91.8 FL (ref 80–100)
MONOCYTES NFR BLD: 6.9 % (ref 2–11)
NEUTROPHILS NFR BLD AUTO: 52.5 % (ref 40–80)
OSMOLALITY SERPL CALC.SUM OF ELEC: 277 MOSM/KG (ref 275–300)
PLATELET # BLD: 210 10X3/UL (ref 130–400)
PMV BLD AUTO: 9.8 FL (ref 7.4–10.4)
POTASSIUM SERPL-SCNC: 4.2 MMOL/L (ref 3.5–5.1)
PROT SERPL-MCNC: 7.1 G/DL (ref 6.4–8.2)
RBC # BLD AUTO: 4.14 10X6/UL (ref 4.2–6.1)
SODIUM SERPL-SCNC: 137 MMOL/L (ref 136–145)
WBC # BLD AUTO: 5.8 10X3/UL (ref 4.8–10.8)

## 2019-04-19 ENCOUNTER — HOSPITAL ENCOUNTER (EMERGENCY)
Dept: HOSPITAL 84 - D.ER | Age: 64
Discharge: HOME | End: 2019-04-19
Payer: MEDICARE

## 2019-04-19 VITALS — BODY MASS INDEX: 39.49 KG/M2 | HEIGHT: 68 IN | WEIGHT: 260.55 LBS

## 2019-04-19 VITALS — DIASTOLIC BLOOD PRESSURE: 75 MMHG | SYSTOLIC BLOOD PRESSURE: 134 MMHG

## 2019-04-19 DIAGNOSIS — T78.49XA: Primary | ICD-10-CM
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
## 2019-08-31 ENCOUNTER — HOSPITAL ENCOUNTER (EMERGENCY)
Dept: HOSPITAL 84 - D.ER | Age: 64
Discharge: HOME | End: 2019-08-31
Payer: MEDICARE

## 2019-08-31 VITALS — SYSTOLIC BLOOD PRESSURE: 136 MMHG | DIASTOLIC BLOOD PRESSURE: 78 MMHG

## 2019-08-31 VITALS — HEIGHT: 68 IN | BODY MASS INDEX: 39.49 KG/M2 | WEIGHT: 260.55 LBS

## 2019-08-31 DIAGNOSIS — L03.115: Primary | ICD-10-CM

## 2020-01-13 ENCOUNTER — HOSPITAL ENCOUNTER (OUTPATIENT)
Dept: HOSPITAL 84 - D.HCCARDIO | Age: 65
Discharge: HOME | End: 2020-01-13
Attending: INTERNAL MEDICINE
Payer: MEDICARE

## 2020-01-13 VITALS — BODY MASS INDEX: 39.6 KG/M2

## 2020-01-13 DIAGNOSIS — I20.9: Primary | ICD-10-CM

## 2020-01-14 ENCOUNTER — HOSPITAL ENCOUNTER (OUTPATIENT)
Dept: HOSPITAL 84 - D.US | Age: 65
Discharge: HOME | End: 2020-01-14
Attending: INTERNAL MEDICINE
Payer: MEDICARE

## 2020-01-14 VITALS — BODY MASS INDEX: 39.6 KG/M2

## 2020-01-14 DIAGNOSIS — R09.89: Primary | ICD-10-CM

## 2020-01-20 ENCOUNTER — HOSPITAL ENCOUNTER (OUTPATIENT)
Dept: HOSPITAL 84 - D.CATH | Age: 65
Discharge: HOME | End: 2020-01-20
Attending: INTERNAL MEDICINE
Payer: MEDICARE

## 2020-01-20 VITALS — BODY MASS INDEX: 39.49 KG/M2 | HEIGHT: 68 IN | WEIGHT: 260.55 LBS

## 2020-01-20 VITALS — DIASTOLIC BLOOD PRESSURE: 82 MMHG | SYSTOLIC BLOOD PRESSURE: 138 MMHG

## 2020-01-20 DIAGNOSIS — I20.9: Primary | ICD-10-CM

## 2020-01-20 DIAGNOSIS — E78.5: ICD-10-CM

## 2020-01-20 DIAGNOSIS — Z79.84: ICD-10-CM

## 2020-01-20 DIAGNOSIS — E11.9: ICD-10-CM

## 2020-01-20 DIAGNOSIS — I10: ICD-10-CM

## 2020-01-20 LAB
ANION GAP SERPL CALC-SCNC: 12.7 MMOL/L (ref 8–16)
BASOPHILS NFR BLD AUTO: 0.2 % (ref 0–2)
BUN SERPL-MCNC: 9 MG/DL (ref 7–18)
CALCIUM SERPL-MCNC: 8.5 MG/DL (ref 8.5–10.1)
CHLORIDE SERPL-SCNC: 98 MMOL/L (ref 98–107)
CO2 SERPL-SCNC: 29.9 MMOL/L (ref 21–32)
CREAT SERPL-MCNC: 1 MG/DL (ref 0.6–1.3)
EOSINOPHIL NFR BLD: 1.1 % (ref 0–7)
ERYTHROCYTE [DISTWIDTH] IN BLOOD BY AUTOMATED COUNT: 12.6 % (ref 11.5–14.5)
GLUCOSE SERPL-MCNC: 178 MG/DL (ref 74–106)
HCT VFR BLD CALC: 39.1 % (ref 42–54)
HGB BLD-MCNC: 13.5 G/DL (ref 13.5–17.5)
IMM GRANULOCYTES NFR BLD: 0.5 % (ref 0–5)
LYMPHOCYTES NFR BLD AUTO: 34.1 % (ref 15–50)
MCH RBC QN AUTO: 33.1 PG (ref 26–34)
MCHC RBC AUTO-ENTMCNC: 34.5 G/DL (ref 31–37)
MCV RBC: 95.8 FL (ref 80–100)
MONOCYTES NFR BLD: 8.9 % (ref 2–11)
NEUTROPHILS NFR BLD AUTO: 55.2 % (ref 40–80)
OSMOLALITY SERPL CALC.SUM OF ELEC: 276 MOSM/KG (ref 275–300)
PLATELET # BLD: 188 10X3/UL (ref 130–400)
PMV BLD AUTO: 9.5 FL (ref 7.4–10.4)
POTASSIUM SERPL-SCNC: 3.6 MMOL/L (ref 3.5–5.1)
RBC # BLD AUTO: 4.08 10X6/UL (ref 4.2–6.1)
SODIUM SERPL-SCNC: 137 MMOL/L (ref 136–145)
WBC # BLD AUTO: 6.3 10X3/UL (ref 4.8–10.8)

## 2020-01-20 NOTE — NUR
4cc OF AIR REMOVED FROM TR BAND. NO BLEEDING/HEMATOMA NOTED. CALL
LIGHT WITHIN REACH. VSS. FAMILY AT BEDSIDE.

## 2020-01-20 NOTE — NUR
RIGHT WRIST TR BAND REMOVED. DRESSING APPLIED. NO BLEEDING/HEMATOMA
NOTED. RIGHT WRIST BRACE IN PLACE.

## 2020-01-20 NOTE — NUR
2cc OF AIR REMOVED FROM TR BAND. NO BLEEDING/HEMATOMA NOTED. CALL
LIGHT WITHIN REACH. FAMILY AT BEDSIDE. VSS.

## 2020-01-20 NOTE — HEMODYNAMI
PATIENT:GRACE MAZARIEGOS                            MEDICAL RECORD: [id number]
: [birth year]                                            LOCATION:D.CAT          
ACCT# [id number]                                       ADMISSION DATE: 20
 
 
 Generatedon:202014:17
Patient name: GRACE MAZARIEGOS Patient #: [id number] Visit #: [id number] SSN: YOB: 1955
Date of study: 2020
Page: Of
Hemodynamic Procedure Report
****************************
Patient Data
Patient Demographics
Procedure consent was obtained
First Name: GRACE         Gender: Male
Last Name: DELILAH          : 1955
Middle Initial: CARTER       Age: 64 year(s)
Patient #: [id number]       Race: Unknown
Visit #: [id number]
Accession #:
[id number]
Additional ID: [id number]
Contact details
Address: [address] [place] [address]   Phone: [phone]
State: [place]
City: [place]
Zip code: [address]
Admission
Admission Data
Admission Date: 2020   Admission Time: 11:24
Arrival Date: 2020     Arrival Time: 0:00
Insurance Payor: Medicare,
Private health insurance
Height (in.): 67.72         BSA: 2.28 (m2)
Height (cm.): 172           BMI: 39.89 (kg/m2)
Weight (lbs.): 260.15
Weight (kg.): 118
Lab Results
Lab Result Date: 2020  Lab Result Time: 0:00
Biochemistry
Name         Units    Result                Min      Max
BUN          mg/dl    9        --(*---)--   7        18
Creatinine   mg/dl    1        --(--*-)--   0.6      1.3
CBC
Name         Units    Result                Min      Max
Hemoglobin   g/dl     13.5     --(*---)--   13.5     17.5
Procedure
Procedure Types
Cath Procedure
Diagnostic Procedure
C
[facility] w/Coronaries
Sedation Charges
Moderate Sedation up to 15 minutes
Procedure Description
Procedure Date
Procedure Date: 2020
Procedure Start Time: 14:04
 
Procedure End Time: 14:13
Procedure Staff
Name                            Function
Colt Guevara MD              Performing Physician
Angelique Wing RT                    Scrub
Joce Thompson RN              Nurse
Chhaya Velásquez RT               Monitor
Procedure Data
Cath Procedure
Fluoroscopy
Diagnostic fluoroscopy      Total fluoroscopy Time: 2.3
time: 2.3 min               min
Diagnostic fluoroscopy      Total fluoroscopy dose: 683
dose: 683 mGy               mGy
Contrast Material
Contrast Material Type                       Amount (ml)
Isovue 300                                   55
Entry Location
Entry     Primary  Successful  Side  Size  Upsize Upsize Entry    Closure     Garcia
ccessful  Closure
Location                             (Fr)  1 (Fr) 2 (Fr) Remarks  Device        
          Remarks
Radial                         Right 6 Fr                         Mechanical
artery                               Short                        Compression
Estimated blood loss: 10 ml
Diagnostic catheters
Device Type               Used For           End Catheter
Placement
DIAGNOSTIC [place] 110cm 5  Procedure
Fr catheter (420922)
Procedure Complications
No complications
Procedure Medications
Medication           Administration Route Dosage
0.9% NaCl            I.V.                 100 ml/hr
Oxygen               etCO2 Nasal cannula  2 l/min
Heparin Flush Bag    added to field       2 bags
(1000units/500ml NS)
Lidocaine 2%         added to field       20
Radial Cocktail      added to field       1 syringe
(Verapamil 2mg/Nitro
400mcg/Heparin
1500units)
Versed               I.V.                 2 mg
Fentanyl             I.V.                 100 mcg
Versed               I.V.                 2 mg
Hemodynamics
Rest
BSA: 2.28 (m2) HGB: 13.5 (g/dl) O2 Consumption: Estimated: 271.79 (ml/min) O2 Co
nsumption indexed:
Estimated:119.21 (ml/min/m) Heart Rate: 76 (bpm)
Pressure Samples
Time     Site     Value (mmHg) Purpose      Heart      Use
Rate(bpm)
14:06    LV       130/-3,5     Snapshot     72
14:07    AO       108/70(87)   Pullback     98
14:07    LV       106/0,-1     Pullback     98
Gradients
Valve  Time  Site 1   Site 2     Mean    SEP/DFP    Peak To Heart  Use
(mmHg)  (sec/min)  Peak    Rate
 
(mmHg)  (bpm)
Aortic 14:07 LV       AO         0       5          0       98
106/0,-1 108/70(87)
Calculations
Valve    P-P      Mean      Valve     Index  Valve    Source
Name              Gradient  Area             Flow
(cm2)
Aortic   0        0
0        0
Snapshots
Pre Cath      Intra         NCS           Post Cath
Vital Signs
Time     Heart  Resp   SPO2 etCO2   NIBP (mmHg)  Rhythm  Pain    Sedation
Rate   (ipm)  (%)  (mmHg)                       Status  Level
(bpm)
13:53:13 77     18     100  35.8    147/88(102)  NSR     0 (11)  10(A)
, No
pain
13:57:18 87     14     95   27.4    142/85(114)  NSR     0 (11)  10(A)
, No
pain
14:02:26 90     11     95   42.7    146/107(126) NSR     0 (11)  10(A)
, No
pain
14:06:33 93     15     93   19      144/91(126)  NSR     0 (11)  10(A)
, No
pain
14:10:43 96     12     94   11.4    137/90(107)  NSR     0 (11)  9(A)
, No
pain
14:13:09 90     11     96   34.3    123/88(118)  NSR     0 (11)  10(A)
, No
pain
Medications
Time     Medication       Route   Dose    Verified Delivered Reason     Notes  E
ffectiveness
by       by
13:52:41 0.9% NaCl        I.V.    100     Joce  Joce   Per
ml/hr   Jay Thompson   physician
RN       RN
13:52:50 Oxygen           etCO2   2 l/min Joce  Joce   for low 02
Nasal           Lorigan  Lorigan   sats
cannula         RN       RN
13:53:03 Heparin Flush    added   2 bags  Joce  Joce   used for
Bag              to              Lorigan  Lorigan   procedure
(1000units/500ml field           RN       RN
NS)
13:53:14 Lidocaine 2%     added   20ml    Joce  Joce   for local
to      vial    Lorigan  Lorigan   anesthetic
field           RN       RN
13:53:24 Radial Cocktail  added   1       Joce  Joce   used for
(Verapamil       to      syringe Lorigan  Lorigan   procedure
2mg/Nitro        field           RN       RN
400mcg/Heparin
1500units)
14:03:31 Versed           I.V.    2 mg    Joce  Joce   for
Lorigan  Lorigan   sedation
RN       RN
14:03:39 Fentanyl         I.V.    100 mcg Joce  Joce   for
Lorigan  Lorigan   sedation
 
RN       RN
14:06:59 Versed           I.V.    2 mg    Joce  Joce   for
Lorigan  Lorigan   sedation
RN       RN
Procedure Log
Time     Note
13:39:44 Informed consent obtained and on chart
13:39:46 Diagnostic Cath Status : Elective
13:41:03 Joce Thompson RN sent for patient. Start room use.
13:41:04 Time tracking: Regular hours (M-F 7:00 - 5:00)
13:41:07 Plan of Care:Hemodynamics will remain stable., Cardiac
rhythm will remain stable., Comfort level will be
maintained., Respiratory function will remain
adequate., Patient/ family verbilizes understanding of
procedure., Procedure tolerated without complication.,
Recovers from procedure without complications..
13:43:42 Patient received from Pre/Post Procedure Room to CCL 2
Alert and oriented. Tansferred to table in Supine
position.
13:43:43 Warm blankets applied, and gretta hugger turned on for
patient comfort.
13:43:43 Correct patient and procedure confirmed by team.
13:43:44 ECG and BP/O2 sat monitors applied to patient.
13:43:52 Stress Test: yes; abnormal 57
13:43:57 Risk of Mortality: 0.1
13:43:59 Risk of blood transfusion: 0.1
13:44:02 Risk of JOSÉ LUIS: 0.2
13:47:25 H&P Date Dictated: 2020 Within 30 days and on
chart., H&P Addendum completed by physician on day of
procedure. (MUST COMPLETE FOR ALL OUTPATIENTS).
13:47:26 Pre-procedure instructions explained to patient.
13:47:26 Pre-op teaching completed and patient verbalized
understanding.
13:47:31 Family in patients room.
13:47:32 Patient NPO since Midnight.
13:47:34 Is the patient allergic to Iodine/contrast media? No.
13:47:36 Is patient on blood thinner?No
13:47:37 Patient diabetic? Yes.
13:47:38 If diabetic: On Metformin? Yes
13:47:40 If on Metformin: Last Dose? 2020
13:47:43 Previous problem with sedation/anesthesia? No ?
13:47:44 Snore? Yes
13:47:45 Sleep apnea? Yes
13:47:46 Deviated septum? No
13:47:47 Opens mouth fully? Yes
13:47:48 Sticks out tongue? Yes
13:47:50 Airway obstruction? No ?
13:47:53 Dentures? No ?
13:47:57 Pre procedure: right dorsailis pedis pulse 1+
Palpable, but thready & weak; easily obliterated
13:47:59 Modified Joseph's test Ulnar < 7 seconds
13:48:03 Patient pain scale 0/10 ?.
13:48:15 IV patent on arrival in left forearm with 0.9% NaCl at
O.
13:49:20 Lab results completed and on chart.
13:49:43 Lab Result : BUN 9 mg/dl
13:49:43 Lab Result : Creatinine 1 mg/dl
13:49:43 Lab Result : Hemoglobin 13.5 g/dl
13:49:52 Right Radial & Right Groin area was prepped with
chlora-prep and draped in sterile fashion
 
13:49:53 Alarms reviewed by R. N.
13:49:54 Sharps counted by scrub and verified by R.N.
13:50:09 2) 60-89 Mildly reduced kidney function, and other
findings (as for stage 1) point to kidney disease.
13:51:23 Maximum allowable contrast dose (3.7 X eGFR X 0.75)222
ml.
13:52:02 Vital chart was started
13:52:08 Baseline sample Acquired.
13:52:24 Rhythm: sinus rhythm
13:52:26 Full Disclosure recording started
13:52:41 0.9% NaCl 100 ml/hr I.V. was administered by Joce Thompson RN; Per physician; Verbal order read back and
verified.
13:52:50 Oxygen 2 l/min etCO2 Nasal cannula was administered by
Joce Thompson RN; for low 02 sats; Verbal order read
back and verified.
13:53:03 Heparin Flush Bag (1000units/500ml NS) 2 bags added to
field was administered by Joce Thompson RN; used for
procedure; Verbal order read back and verified.
13:53:14 Lidocaine 2% 20ml vial added to field was administered
by Joce Thompson RN; for local anesthetic; Verbal
order read back and verified.
13:53:24 Radial Cocktail (Verapamil 2mg/Nitro 400mcg/Heparin
1500units) 1 syringe added to field was administered
by Joce Thompson RN; used for procedure; Verbal
order read back and verified.
13:53:29 Use device set Radial Dx or PCI
13:54:08 ACIST Syringe (76720) opened to sterile field.
13:54:09 Medline Cath Pack ([id number]) opened to sterile field.
13:54:09 Bag Decanter () opened to sterile field.
13:54:10 ACIST Hand Control (62092) opened to sterile field.
13:54:10 ACIST Manifold (41848) opened to sterile field.
13:54:11 Tegaderm 4 x 4 (1626W) opened to sterile field.
13:54:13 MBrace Wrist Support ([id number]) opened to sterile
field.
13:54:15 EMERALD Guide Wire (961-578) opened to sterile field.
13:54:16 SHEATH 6FR RAIN (4524892) opened to sterile field.
13:59:51 Physician arrived
13:59:51 --------ALL STOP TIME OUT------
13:59:52 Final Timeout: patient, procedure, and site verified
with staff and physician. All members of the team are
in agreement.
13:59:54 Right Radial & Right Groin site verified by team.
13:59:59 Fire Safety Assessment: A--An alcohol-based skin
anteseptic being used preoperatively., C--Open oxygen
or nitrous oxide is being used., D--An ESU, laser, or
fiber-optic light is being used.
14:00:41 Physical assessment completed. ASA score P 2 - A
patient with mild systemic disease as per Colt Guevara MD.
14:00:45 Sedation plan: IV Moderate Sedation Medication:Versed,
Fentanyl
14:03:31 Versed 2 mg I.V. was administered by Joce Thompson RN; for sedation; Verbal order read back and verified.
14:03:39 Fentanyl 100 mcg I.V. was administered by Joce Thompson RN; for sedation; Verbal order read back and
verified.
14:04:00 Procedure started.
14:04:18 Local anesthetic to right radial artery with Lidocaine
2% by Colt Guevara MD.**INITIAL ACCESS ONLY**
 
14:04:27 A 6 Fr Short sheath was inserted into the Right Radial
artery
14:06:33 A DIAGNOSTIC Tiger 110cm 5 Fr catheter ([id number]) was
advanced over the wire and used for Procedure.
14:06:54 LV angiography performed.
14:06:59 Versed 2 mg I.V. was administered by Joce Thompson RN; for sedation; Verbal order read back and verified.
14:07:04 EF : 55 %
14:07:35 LCA angiography performed.
14:10:12 RCA angiography performed.
14:10:14 Catheter removed.
14:10:25 ZEPHYR REGULAR TR BAND (417342) opened to sterile
field.
14:10:55 Sheath removed intact; hemostasis achieved with
Mechanical Compression to the Right Radial artery.
14:10:58 Procedure ended.(Physican Out)
14:11:09 Fluoroscopy time 02.30 minutes.
14:11:14 Fluoroscopy dose: 683 mGy
14:11:14 Flurop Dose total: 683
14:11:25 Dose Area Product 95432 mGy/cm.
14:11:29 Contrast amount:Isovue 300 55ml.
14:11:32 Maximum allowable dose exceeded? No.
14:11:33 Sharps counted by scrub and verified by R.N.
14:11:36 [place] band inflated with 10cc of air.
14:11:37 Insertion/operative site no bleeding no hematoma.
14:11:44 Post Procedure Pulses reassessed and unchanged
14:11:51 Post-procedure physical assessment completed. ASA
score P 2 - A patient with mild systemic disease as
per Colt Guevara MD.
14:11:56 Post procedure rhythm: unchanged.
14:11:59 Estimated blood loss: 10 ml
14:12:03 Post procedure instruction explained to
patient.Patient verbalizes understanding.
14:12:33 Procedure type changed to Cath procedure, Diagnostic
procedure, LHC, C w/Coronaries, Sedation Charges,
Moderate Sedation up to 15 minutes
14:12:35 Procedure and supply charges have been captured,
reviewed, submitted and are correct.
14:12:59 Procedure Complication : No complications
14:13:05 Vital chart was stopped
14:13:07 [facility] Findings: mild to moderate CAD (<70%)
14:13:09 Operative report dictated upon procedure completion.
14:13:10 See physician's report for complete and final results.
14:13:13 Report given to Pre/Post Procedure Room.
14:13:17 Patient transfered to Pre/Post Procedure Room with
Stretcher.
14:13:19 Procedure ended.
14:13:19 Full Disclosure recording stopped
14:13:40 End room use (Document Last)
14:13:55 End room use (Document Last)
14:14:17 Arrival Date: 2020 12:00:00 AM
14:14:41 Patient Height : 67.72 inches
14:14:47 Patient Weight : 260.15 lbs
14:14:55 Insurance Payor : Private health insurance, Medicare
Device Usage
Item Name   Manufacture  Quantity  Catalog      Hospital Part     Current Minima
l Lot# /
Number       Charge   Number   Stock   Stock   Serial#
Code
ACIST       Acist        1         11522        485314   943325   [id number]  20
 
[employer]ice [id number] [employer] Solutions
Medline     Medline      1         [id number]    074345   18502    954581  5
Cath Pack
([id number])
Bag         Microtek     1         S        485697   70356    913868  5
Decanter    Medical Inc.
()
ACIST Hand  Acist        1         75543        104342   009587   483260  5
Control     Medical
(12493)     Systems Inc
ACIST       Acist        1         79463        188196   210998   309203  5
Manifold    Medical
(30594)     Systems Inc
Tegaderm 4  3M           1         1626W        194750   255339   134526  5
x 4 (1626W)
MBrace      Advanced     1         [phone]  353543   38152    466883  5
Wrist       Vascular
Support     Dynamics
(389601647)
EMERALD     Cardinal     1         397-425      524693   420326   033917  5
Guide Wire  Health
(136-267)
SHEATH 6FR  Cardinal     1         2364303      698091   2235371  139032  5
[facility]        Health
(6934483)
DIAGNOSTIC  Terumo       1               254285   965334   597408  5
Tiger 110cm
5 Fr
catheter
(882157)
ZEPHYR      Cardinal     1         138864       290300   6120285  029764  5
REGULAR TR  Health
BAND
(032085)
Signature Audit [place]
Stage           Time        Signature      Unsigned
Intra-Procedure 2020   Chhaya Velásquez
2:15:58 PM  RT(R)
Intra-Procedure 2020   Joce
2:16:54 PM  Jay RN
Intra-Procedure 2020   Colt Patel
2:17:20 PM  Christopher CARO
Signatures
Performing Physician :  Signature :
Colt Guevara MD      ______________________________
Date : ______________ Time :
______________
Nurse : Joce Thompson  Signature :
RN                       ______________________________
Date : ______________ Time :
______________
Monitor : Chhaya Velásquez Signature :
RT                       ______________________________
 
Date : ______________ Time :
______________
 
 
 
 
 
 
 
 
 
 
 
 
 
 
 
 
 
 
 
 
 
 
 
 
 
 
 
 
 
 
 
 
 
 
 
 
 
 
 
 
 
 
 
 
 
 
 
 
 
 
 
 
 
[address] [place] [address]

## 2020-01-20 NOTE — NUR
PT'S HEAD OF BED AT 30 DEGREES. SET UP WITH SANDWICH AND DRINK.
DENIES NAUSEA/PAIN AT THIS TIME. RIGHT WRIST TR BAND IN PLACE. NO
BLEEDING/HEMATOMA NOTED.

## 2020-01-20 NOTE — NUR
PIV D/C'D WITH CATH TIP INTACT. TOLERATED WELL. RIGHT WRIST DRESSING
C/D/I. NO S/S OF HEMATOMA NOTED. DISCUSSED DISCHARGE INSTRUCTIONS
WITH PT AND PT'S FAMILY. THEY VOICED UNDERSTANDING. PT INSTRUCTED TO
GET UP AND DRESSED AT THIS TIME. WIFE AT BEDSIDE TO ASSIST.

## 2020-01-20 NOTE — NUR
PT REFUSED WHEELCHAIR. AMBUATED TO RESTROOM. VOIDED WITHOUT
DIFFICULTY. PT AMBULATED TO VEHICLE. WIFE DRIVING. NO S/S OF DISTRESS
NOTED. ALL BELONGINGS AND PAPERWORK IN HAND.

## 2020-01-21 NOTE — OP
PATIENT NAME:  GRACE MAZARIEGOS                     MEDICAL RECORD: [id number]
:[birth year]                                             LOCATION:D.CAT          
                                                         ADMISSION DATE:        
SURGEON:  MARIA TERESA BROWN MD          
 
 
DATE OF OPERATION:  2020
 
PROCEDURE:  Left heart catheterization, selective coronary angiography, right
radial approach.
 
CATHETERS:  Tiger catheter, radial sheath.
 
The procedure was well tolerated.  The patient returned to the whalen.  Sheath was
removed.  TR band was placed.
 
FINDINGS:  Left ventriculography in 30-degree LUNSFORD view:  Normal wall motion and
normal systolic function.
 
CORONARY ANATOMY:
LEFT MAIN:  Left main is free of disease.
LAD:  LAD is free of disease at the diagonal system.
CIRCUMFLEX:  Codominant system, large circumflex free of disease.
RIGHT CORONARY ARTERY:  Again, large vessel, free of disease.
 
IMPRESSION:  No evidence of obstructive coronary artery disease, normal left
ventricular function.
 
TRANSINT:[id number] Voice Confirmation ID: [id number] DOCUMENT ID: [id number]
                                           
                                           MARIA TERESA BROWN MD          
 
 
 
Electronically Signed by MARIA TERESA BROWN on 20 at 1458
 
 
 
 
 
 
 
 
 
 
 
 
 
 
 
CC:                                                             [id number]
DICTATION DATE: 20 1419     :     20      DEP CLI 
                                                                      20
[facility]                                          
[address] [place] [address]

## 2020-05-14 ENCOUNTER — HOSPITAL ENCOUNTER (OUTPATIENT)
Dept: HOSPITAL 84 - D.LAB | Age: 65
Discharge: HOME | End: 2020-05-14
Attending: INTERNAL MEDICINE
Payer: MEDICARE

## 2020-05-14 VITALS — BODY MASS INDEX: 39.6 KG/M2

## 2020-05-14 DIAGNOSIS — R74.0: ICD-10-CM

## 2020-05-14 DIAGNOSIS — K76.0: Primary | ICD-10-CM

## 2020-05-14 LAB
FERRITIN SERPL-MCNC: 145 NG/ML (ref 3–244)
GGT SERPL-CCNC: 69 U/L (ref 5–85)
IRON SERPL-MCNC: 133 UG/DL (ref 35–150)
SAO2 % BLD FROM PO2: 35 % (ref 15–55)
TIBC SERPL-MCNC: 373 UG/DL (ref 260–445)
UIBC SERPL-MCNC: 240 UG/DL (ref 150–375)

## 2020-05-16 LAB — HAPTOGLOB SERPL-MCNC: 196 MG/DL (ref 32–363)

## 2020-05-18 LAB
ACTIN IGG SERPL-ACNC: 5 UNITS (ref 0–19)
CENTROMERE B AB SER-ACNC: <0.2 AI (ref 0–0.9)
CHROMATIN AB SERPL-ACNC: <0.2 AI (ref 0–0.9)
DSDNA AB SER-ACNC: 11 IU/ML (ref 0–9)
ENA JO1 AB SER-ACNC: <0.2 AI (ref 0–0.9)
ENA RNP AB SER-ACNC: <0.2 AI (ref 0–0.9)
ENA SCL70 AB SER-ACNC: <0.2 AI (ref 0–0.9)
ENA SM AB SER-ACNC: <0.2 AI (ref 0–0.9)
ENA SS-A AB SER-ACNC: <0.2 AI (ref 0–0.9)
ENA SS-B AB SER-ACNC: <0.2 AI (ref 0–0.9)
MITOCHONDRIA M2 IGG SER-ACNC: <20 UNITS (ref 0–20)

## 2020-08-17 ENCOUNTER — HOSPITAL ENCOUNTER (OUTPATIENT)
Dept: HOSPITAL 84 - D.LAB | Age: 65
Discharge: HOME | End: 2020-08-17
Attending: INTERNAL MEDICINE
Payer: MEDICARE

## 2020-08-17 VITALS — BODY MASS INDEX: 39.6 KG/M2

## 2020-08-17 DIAGNOSIS — K76.0: Primary | ICD-10-CM

## 2020-08-17 LAB
ALBUMIN SERPL-MCNC: 3.7 G/DL (ref 3.4–5)
ALP SERPL-CCNC: 55 U/L (ref 30–120)
ALT SERPL-CCNC: 89 U/L (ref 10–68)
BILIRUB DIRECT SERPL-MCNC: 0.38 MG/DL (ref 0–0.3)
BILIRUB INDIRECT SERPL-MCNC: 0.84 MG/DL (ref 0–1)
BILIRUB SERPL-MCNC: 1.22 MG/DL (ref 0.2–1.3)
GLOBULIN SER-MCNC: 3.1 G/L
PROT SERPL-MCNC: 6.8 G/DL (ref 6.4–8.2)

## 2021-05-24 ENCOUNTER — HOSPITAL ENCOUNTER (INPATIENT)
Dept: HOSPITAL 84 - D.ER | Age: 66
LOS: 3 days | Discharge: NURSING, REHAB OR LONG-TERM CARE | DRG: 438 | End: 2021-05-27
Attending: FAMILY MEDICINE | Admitting: FAMILY MEDICINE
Payer: MEDICARE

## 2021-05-24 VITALS — DIASTOLIC BLOOD PRESSURE: 95 MMHG | SYSTOLIC BLOOD PRESSURE: 146 MMHG

## 2021-05-24 VITALS — WEIGHT: 244 LBS | HEIGHT: 68 IN | BODY MASS INDEX: 36.98 KG/M2

## 2021-05-24 DIAGNOSIS — F10.239: ICD-10-CM

## 2021-05-24 DIAGNOSIS — G93.41: ICD-10-CM

## 2021-05-24 DIAGNOSIS — G47.33: ICD-10-CM

## 2021-05-24 DIAGNOSIS — K21.9: ICD-10-CM

## 2021-05-24 DIAGNOSIS — I10: ICD-10-CM

## 2021-05-24 DIAGNOSIS — K85.90: Primary | ICD-10-CM

## 2021-05-24 DIAGNOSIS — E78.5: ICD-10-CM

## 2021-05-24 DIAGNOSIS — E11.9: ICD-10-CM

## 2021-05-24 DIAGNOSIS — M19.90: ICD-10-CM

## 2021-05-24 DIAGNOSIS — F17.200: ICD-10-CM

## 2021-05-24 LAB
ALBUMIN SERPL-MCNC: 3.7 G/DL (ref 3.4–5)
ALP SERPL-CCNC: 71 U/L (ref 30–120)
ALT SERPL-CCNC: 66 U/L (ref 10–68)
AMYLASE SERPL-CCNC: 137 U/L (ref 25–115)
ANION GAP SERPL CALC-SCNC: 16.2 MMOL/L (ref 8–16)
BASOPHILS NFR BLD AUTO: 0.4 % (ref 0–2)
BILIRUB SERPL-MCNC: 1.24 MG/DL (ref 0.2–1.3)
BILIRUB SERPL-MCNC: NEGATIVE MG/DL
BUN SERPL-MCNC: 17 MG/DL (ref 7–18)
CALCIUM SERPL-MCNC: 9.6 MG/DL (ref 8.5–10.1)
CHLORIDE SERPL-SCNC: 92 MMOL/L (ref 98–107)
CO2 SERPL-SCNC: 27.2 MMOL/L (ref 21–32)
CREAT SERPL-MCNC: 1 MG/DL (ref 0.6–1.3)
EOSINOPHIL NFR BLD: 0.1 % (ref 0–7)
ERYTHROCYTE [DISTWIDTH] IN BLOOD BY AUTOMATED COUNT: 14.1 % (ref 11.5–14.5)
GLOBULIN SER-MCNC: 3.7 G/L
GLUCOSE SERPL-MCNC: 119 MG/DL (ref 74–106)
HCT VFR BLD CALC: 41.7 % (ref 42–54)
HGB BLD-MCNC: 14.6 G/DL (ref 13.5–17.5)
KETONES UR STRIP-MCNC: (no result) MG/DL
LIPASE SERPL-CCNC: 3193 U/L (ref 73–393)
LYMPHOCYTES # BLD: 1.6 10X3/UL (ref 1.32–3.57)
LYMPHOCYTES NFR BLD AUTO: 14.8 % (ref 15–50)
MCH RBC QN AUTO: 33.1 PG (ref 26–34)
MCHC RBC AUTO-ENTMCNC: 35 G/DL (ref 31–37)
MCV RBC: 94.5 FL (ref 80–100)
MONOCYTES NFR BLD: 6.4 % (ref 2–11)
NEUTROPHILS # BLD: 8.3 10X3/UL (ref 1.78–5.38)
NEUTROPHILS NFR BLD AUTO: 78.3 % (ref 40–80)
NITRITE UR-MCNC: NEGATIVE MG/ML
OSMOLALITY SERPL CALC.SUM OF ELEC: 267 MOSM/KG (ref 275–300)
PH UR STRIP: 5 [PH] (ref 5–8)
PLATELET # BLD: 198 10X3/UL (ref 130–400)
PMV BLD AUTO: 8 FL (ref 7.4–10.4)
POTASSIUM SERPL-SCNC: 3.4 MMOL/L (ref 3.5–5.1)
PROT SERPL-MCNC: 7.4 G/DL (ref 6.4–8.2)
RBC # BLD AUTO: 4.42 10X6/UL (ref 4.2–6.1)
SODIUM SERPL-SCNC: 132 MMOL/L (ref 136–145)
TROPONIN I SERPL-MCNC: < 0.017 NG/ML (ref 0–0.06)
UROBILINOGEN UR-MCNC: NORMAL MG/DL (ref ?–2)
WBC # BLD AUTO: 10.6 10X3/UL (ref 4.8–10.8)
WBC #/AREA URNS HPF: (no result) HPF (ref 0–1)

## 2021-05-24 NOTE — NUR
PT ARRIVED ON UNIT VIA WHEELCHAIR, ESCORTED BY ER NURSE. ORIENTED TO ROOM AND
CALL LIGHT. PT PACING AROUND THE ROOM.

## 2021-05-25 VITALS — DIASTOLIC BLOOD PRESSURE: 95 MMHG | SYSTOLIC BLOOD PRESSURE: 146 MMHG

## 2021-05-25 VITALS — SYSTOLIC BLOOD PRESSURE: 165 MMHG | DIASTOLIC BLOOD PRESSURE: 90 MMHG

## 2021-05-25 VITALS — DIASTOLIC BLOOD PRESSURE: 70 MMHG | SYSTOLIC BLOOD PRESSURE: 129 MMHG

## 2021-05-25 VITALS — SYSTOLIC BLOOD PRESSURE: 147 MMHG | DIASTOLIC BLOOD PRESSURE: 90 MMHG

## 2021-05-25 VITALS — SYSTOLIC BLOOD PRESSURE: 160 MMHG | DIASTOLIC BLOOD PRESSURE: 90 MMHG

## 2021-05-25 VITALS — DIASTOLIC BLOOD PRESSURE: 78 MMHG | SYSTOLIC BLOOD PRESSURE: 152 MMHG

## 2021-05-25 LAB
ALBUMIN SERPL-MCNC: 3.1 G/DL (ref 3.4–5)
ALP SERPL-CCNC: 66 U/L (ref 30–120)
ALT SERPL-CCNC: 50 U/L (ref 10–68)
AMYLASE SERPL-CCNC: 79 U/L (ref 25–115)
ANION GAP SERPL CALC-SCNC: 13.8 MMOL/L (ref 8–16)
APTT BLD: 29.7 SECONDS (ref 22.8–39.4)
BASOPHILS NFR BLD AUTO: 0.2 % (ref 0–2)
BILIRUB SERPL-MCNC: 1.37 MG/DL (ref 0.2–1.3)
BUN SERPL-MCNC: 15 MG/DL (ref 7–18)
CALCIUM SERPL-MCNC: 8.4 MG/DL (ref 8.5–10.1)
CHLORIDE SERPL-SCNC: 91 MMOL/L (ref 98–107)
CO2 SERPL-SCNC: 26.6 MMOL/L (ref 21–32)
CREAT SERPL-MCNC: 0.9 MG/DL (ref 0.6–1.3)
EOSINOPHIL NFR BLD: 0.6 % (ref 0–7)
ERYTHROCYTE [DISTWIDTH] IN BLOOD BY AUTOMATED COUNT: 13.9 % (ref 11.5–14.5)
GLOBULIN SER-MCNC: 3.4 G/L
GLUCOSE SERPL-MCNC: 261 MG/DL (ref 74–106)
HCT VFR BLD CALC: 36.7 % (ref 42–54)
HGB BLD-MCNC: 12.7 G/DL (ref 13.5–17.5)
INR PPP: 1.09 (ref 0.85–1.17)
LIPASE SERPL-CCNC: 1895 U/L (ref 73–393)
LYMPHOCYTES # BLD: 1.4 10X3/UL (ref 1.32–3.57)
LYMPHOCYTES NFR BLD AUTO: 16.1 % (ref 15–50)
MAGNESIUM SERPL-MCNC: 2 MG/DL (ref 1.8–2.4)
MCH RBC QN AUTO: 33.6 PG (ref 26–34)
MCHC RBC AUTO-ENTMCNC: 34.7 G/DL (ref 31–37)
MCV RBC: 96.9 FL (ref 80–100)
MONOCYTES NFR BLD: 6.9 % (ref 2–11)
NEUTROPHILS # BLD: 6.8 10X3/UL (ref 1.78–5.38)
NEUTROPHILS NFR BLD AUTO: 76.2 % (ref 40–80)
OSMOLALITY SERPL CALC.SUM OF ELEC: 266 MOSM/KG (ref 275–300)
PHOSPHATE SERPL-MCNC: 4 MG/DL (ref 2.5–4.9)
PLATELET # BLD: 152 10X3/UL (ref 130–400)
PMV BLD AUTO: 8.4 FL (ref 7.4–10.4)
POTASSIUM SERPL-SCNC: 3.4 MMOL/L (ref 3.5–5.1)
PROT SERPL-MCNC: 6.5 G/DL (ref 6.4–8.2)
PROTHROMBIN TIME: 13 SECONDS (ref 11.6–15)
RBC # BLD AUTO: 3.78 10X6/UL (ref 4.2–6.1)
SODIUM SERPL-SCNC: 128 MMOL/L (ref 136–145)
WBC # BLD AUTO: 9 10X3/UL (ref 4.8–10.8)

## 2021-05-25 NOTE — NUR
RECEIVED BEDSIDE REPORT. PT LAYING IN BED A&O X4. PT REMOVED PIV FROM RIGHT
FOREARM, CATH INTACT, NO REDNESS OR SWELLING. TELEMETRY IN PLACE, 93 SR.
GLASSES ON. PT ABLE TO AMBULATE AD PAOLA. EDUCATED PT ONCL AND NEEDS, VERBALIZED
UNDERSTANDING. BED LOW, CL IN REACH.

## 2021-05-26 VITALS — DIASTOLIC BLOOD PRESSURE: 86 MMHG | SYSTOLIC BLOOD PRESSURE: 154 MMHG

## 2021-05-26 VITALS — SYSTOLIC BLOOD PRESSURE: 115 MMHG | DIASTOLIC BLOOD PRESSURE: 76 MMHG

## 2021-05-26 VITALS — SYSTOLIC BLOOD PRESSURE: 151 MMHG | DIASTOLIC BLOOD PRESSURE: 92 MMHG

## 2021-05-26 VITALS — SYSTOLIC BLOOD PRESSURE: 127 MMHG | DIASTOLIC BLOOD PRESSURE: 64 MMHG

## 2021-05-26 VITALS — DIASTOLIC BLOOD PRESSURE: 60 MMHG | SYSTOLIC BLOOD PRESSURE: 122 MMHG

## 2021-05-26 VITALS — SYSTOLIC BLOOD PRESSURE: 149 MMHG | DIASTOLIC BLOOD PRESSURE: 83 MMHG

## 2021-05-26 LAB
ALBUMIN SERPL-MCNC: 3 G/DL (ref 3.4–5)
ALP SERPL-CCNC: 67 U/L (ref 30–120)
ALT SERPL-CCNC: 42 U/L (ref 10–68)
ANION GAP SERPL CALC-SCNC: 13.6 MMOL/L (ref 8–16)
BASOPHILS NFR BLD AUTO: 0.1 % (ref 0–2)
BILIRUB SERPL-MCNC: 1.2 MG/DL (ref 0.2–1.3)
BUN SERPL-MCNC: 10 MG/DL (ref 7–18)
CALCIUM SERPL-MCNC: 8.6 MG/DL (ref 8.5–10.1)
CHLORIDE SERPL-SCNC: 96 MMOL/L (ref 98–107)
CO2 SERPL-SCNC: 28.7 MMOL/L (ref 21–32)
CREAT SERPL-MCNC: 0.8 MG/DL (ref 0.6–1.3)
EOSINOPHIL NFR BLD: 1.2 % (ref 0–7)
ERYTHROCYTE [DISTWIDTH] IN BLOOD BY AUTOMATED COUNT: 13.7 % (ref 11.5–14.5)
GLOBULIN SER-MCNC: 3.7 G/L
GLUCOSE SERPL-MCNC: 235 MG/DL (ref 74–106)
HCT VFR BLD CALC: 38.7 % (ref 42–54)
HGB BLD-MCNC: 13.2 G/DL (ref 13.5–17.5)
INR PPP: 1.15 (ref 0.85–1.17)
LYMPHOCYTES # BLD: 1.3 10X3/UL (ref 1.32–3.57)
LYMPHOCYTES NFR BLD AUTO: 13.1 % (ref 15–50)
MAGNESIUM SERPL-MCNC: 2.4 MG/DL (ref 1.8–2.4)
MCH RBC QN AUTO: 33.5 PG (ref 26–34)
MCHC RBC AUTO-ENTMCNC: 34.2 G/DL (ref 31–37)
MCV RBC: 98 FL (ref 80–100)
MONOCYTES NFR BLD: 6.7 % (ref 2–11)
NEUTROPHILS # BLD: 7.8 10X3/UL (ref 1.78–5.38)
NEUTROPHILS NFR BLD AUTO: 78.9 % (ref 40–80)
OSMOLALITY SERPL CALC.SUM OF ELEC: 276 MOSM/KG (ref 275–300)
PHOSPHATE SERPL-MCNC: 2.9 MG/DL (ref 2.5–4.9)
PLATELET # BLD: 151 10X3/UL (ref 130–400)
PMV BLD AUTO: 8.8 FL (ref 7.4–10.4)
POTASSIUM SERPL-SCNC: 3.3 MMOL/L (ref 3.5–5.1)
PROT SERPL-MCNC: 6.7 G/DL (ref 6.4–8.2)
PROTHROMBIN TIME: 13.6 SECONDS (ref 11.6–15)
RBC # BLD AUTO: 3.95 10X6/UL (ref 4.2–6.1)
SODIUM SERPL-SCNC: 135 MMOL/L (ref 136–145)
WBC # BLD AUTO: 9.9 10X3/UL (ref 4.8–10.8)

## 2021-05-26 NOTE — NUR
RECIEVED BEDSIDE REPORT. PATIENT IS REFUSING FALL ALARMS. EDUCATED ABOUT THE
IMPORTANCE. STILL REFUSED. SIGNED THE BED/CHAIR ALARM WAIVER, PLACED ON PAPER
CHART. DENIES FURTHER NEEDS AT THIS TIME. BED LOW POSITION, CALL LIGHT IN
REACH. FREE FROM SIGNS OF DISTRESS. WILL CONTINUE TO MONITOR.

## 2021-05-26 NOTE — NUR
RECEIVED BEDSIDE REPORT. PT LAYING IN BED A&O X4. PIV TO RIGHT FOREARM, PATENT
AND INFUSING, NO REDNESS OR SWELLING. TELEMETRY IN PLACE 69 SR. GLASSES ON. PT
ABLE TO AMBULATE WITH STAND BY ASSIST, REFUSES ALARMS, SIGNED WAIVER. EDUCATED
PT ON CL AND NEEDS, VERBALIZED UNDERSTANDING. BED LOW, CL IN REACH.

## 2021-05-27 ENCOUNTER — HOSPITAL ENCOUNTER (INPATIENT)
Dept: HOSPITAL 84 - D.REHAB | Age: 66
LOS: 12 days | Discharge: HOME | DRG: 70 | End: 2021-06-08
Attending: FAMILY MEDICINE | Admitting: FAMILY MEDICINE
Payer: MEDICARE

## 2021-05-27 VITALS — DIASTOLIC BLOOD PRESSURE: 76 MMHG | SYSTOLIC BLOOD PRESSURE: 142 MMHG

## 2021-05-27 VITALS — SYSTOLIC BLOOD PRESSURE: 143 MMHG | DIASTOLIC BLOOD PRESSURE: 79 MMHG

## 2021-05-27 VITALS — DIASTOLIC BLOOD PRESSURE: 81 MMHG | SYSTOLIC BLOOD PRESSURE: 138 MMHG

## 2021-05-27 VITALS
HEIGHT: 68 IN | BODY MASS INDEX: 37.44 KG/M2 | HEIGHT: 68 IN | BODY MASS INDEX: 37.44 KG/M2 | BODY MASS INDEX: 37.44 KG/M2 | WEIGHT: 247 LBS | WEIGHT: 247 LBS

## 2021-05-27 VITALS — DIASTOLIC BLOOD PRESSURE: 81 MMHG | SYSTOLIC BLOOD PRESSURE: 158 MMHG

## 2021-05-27 DIAGNOSIS — K85.20: ICD-10-CM

## 2021-05-27 DIAGNOSIS — F10.10: ICD-10-CM

## 2021-05-27 DIAGNOSIS — I12.9: ICD-10-CM

## 2021-05-27 DIAGNOSIS — K21.9: ICD-10-CM

## 2021-05-27 DIAGNOSIS — F17.200: ICD-10-CM

## 2021-05-27 DIAGNOSIS — G47.33: ICD-10-CM

## 2021-05-27 DIAGNOSIS — M19.90: ICD-10-CM

## 2021-05-27 DIAGNOSIS — E11.40: ICD-10-CM

## 2021-05-27 DIAGNOSIS — E11.22: ICD-10-CM

## 2021-05-27 DIAGNOSIS — E78.5: ICD-10-CM

## 2021-05-27 DIAGNOSIS — N18.9: ICD-10-CM

## 2021-05-27 DIAGNOSIS — G93.41: Primary | ICD-10-CM

## 2021-05-27 LAB
ALBUMIN SERPL-MCNC: 2.7 G/DL (ref 3.4–5)
ALP SERPL-CCNC: 64 U/L (ref 30–120)
ALT SERPL-CCNC: 35 U/L (ref 10–68)
ANION GAP SERPL CALC-SCNC: 11.7 MMOL/L (ref 8–16)
BASOPHILS NFR BLD AUTO: 0.2 % (ref 0–2)
BILIRUB SERPL-MCNC: 0.83 MG/DL (ref 0.2–1.3)
BUN SERPL-MCNC: 11 MG/DL (ref 7–18)
CALCIUM SERPL-MCNC: 8.3 MG/DL (ref 8.5–10.1)
CHLORIDE SERPL-SCNC: 100 MMOL/L (ref 98–107)
CO2 SERPL-SCNC: 28.6 MMOL/L (ref 21–32)
CREAT SERPL-MCNC: 0.8 MG/DL (ref 0.6–1.3)
EOSINOPHIL NFR BLD: 2.9 % (ref 0–7)
ERYTHROCYTE [DISTWIDTH] IN BLOOD BY AUTOMATED COUNT: 13.6 % (ref 11.5–14.5)
GLOBULIN SER-MCNC: 3.6 G/L
GLUCOSE SERPL-MCNC: 244 MG/DL (ref 74–106)
HCT VFR BLD CALC: 35.3 % (ref 42–54)
HGB BLD-MCNC: 12.3 G/DL (ref 13.5–17.5)
LYMPHOCYTES # BLD: 1.3 10X3/UL (ref 1.32–3.57)
LYMPHOCYTES NFR BLD AUTO: 18.4 % (ref 15–50)
MAGNESIUM SERPL-MCNC: 2.3 MG/DL (ref 1.8–2.4)
MCH RBC QN AUTO: 34 PG (ref 26–34)
MCHC RBC AUTO-ENTMCNC: 34.7 G/DL (ref 31–37)
MCV RBC: 97.9 FL (ref 80–100)
MONOCYTES NFR BLD: 8.4 % (ref 2–11)
NEUTROPHILS # BLD: 5.1 10X3/UL (ref 1.78–5.38)
NEUTROPHILS NFR BLD AUTO: 70.1 % (ref 40–80)
OSMOLALITY SERPL CALC.SUM OF ELEC: 280 MOSM/KG (ref 275–300)
PHOSPHATE SERPL-MCNC: 2.6 MG/DL (ref 2.5–4.9)
PLATELET # BLD: 167 10X3/UL (ref 130–400)
PMV BLD AUTO: 8.6 FL (ref 7.4–10.4)
POTASSIUM SERPL-SCNC: 3.3 MMOL/L (ref 3.5–5.1)
PROT SERPL-MCNC: 6.3 G/DL (ref 6.4–8.2)
RBC # BLD AUTO: 3.61 10X6/UL (ref 4.2–6.1)
SODIUM SERPL-SCNC: 137 MMOL/L (ref 136–145)
WBC # BLD AUTO: 7.3 10X3/UL (ref 4.8–10.8)

## 2021-05-27 NOTE — NUR
RECIEVED BEDSIDE REPORT. PATIENT RESTING COMFORTABLY. AROUSES TO VOICE, DENIES
NEEDS AT THIS TIME. FREE FROM SIGNS OF DISTRESS. BED LOW POSITION, CALL LIGHT
IN REACH. WILL CONTINUE TO MONITOR.

## 2021-05-27 NOTE — NUR
REHAB PRESCREEN ORDER RECEIVED. PATIENT'S THERAPY EVALUATIONS ARE STILL
PENDING. I HAVE ASKED FOR THEM TO BE BUMPED TO THE TOP OF THE LIST IF POSSIBLE
SO THAT WE CAN WORK ON HIS SCREEN. I WILL CONTINUE TO CHECK THE CHART SO THAT
HIS SCREEN CAN BE COMPLETED.
THANK YOU FOR THIS REFERRAL.
OZZY HUMMEL RN
CLINICAL LIAISON, INPATIENT REHAB.

## 2021-05-27 NOTE — NUR
DISCHARGE PAPERS COMPLETE. PER AMANDA MALIK, LEAVE IV AND TELEMETRY
ON. NO FURTHER QUESTIONS. WIFE AT BEDSIDE. SIGNED PAPERS. GATHERED BELONGINGS.
LEFT UNIT VIA WHEELCHAIR TO REHAB ROOM 1112B. REPORT CALLED TO SAMANTHA.

## 2021-05-27 NOTE — NUR
PT AGREED TO NEW IV. INSERTED PIV INTO LEFT WRIST, 22G, GOOD RETURN, FLUSHES
WELL, TOLERATED WELL. WILL CONTINUE TO MONITOR.

## 2021-05-27 NOTE — NUR
PIV TO RIGHT FOREARM LEAKING, REMOVED IV, CATH INTACT, NO REDNESS OR SWELLING,
TOLERATED WELL. TRIED TO INSERT NEW IV AND PT REFUSES, STATES THAT HE IS GOING
HOME TODAY AND DOES NOT WANT ANOTHER IV. WILL CONTINUE TO MONITOR.

## 2021-05-28 VITALS — SYSTOLIC BLOOD PRESSURE: 142 MMHG | DIASTOLIC BLOOD PRESSURE: 81 MMHG

## 2021-05-28 VITALS — SYSTOLIC BLOOD PRESSURE: 133 MMHG | DIASTOLIC BLOOD PRESSURE: 85 MMHG

## 2021-05-28 LAB
ANION GAP SERPL CALC-SCNC: 16.7 MMOL/L (ref 8–16)
BASOPHILS NFR BLD AUTO: 0.3 % (ref 0–2)
BUN SERPL-MCNC: 11 MG/DL (ref 7–18)
CALCIUM SERPL-MCNC: 8.6 MG/DL (ref 8.5–10.1)
CHLORIDE SERPL-SCNC: 97 MMOL/L (ref 98–107)
CO2 SERPL-SCNC: 26.1 MMOL/L (ref 21–32)
CREAT SERPL-MCNC: 0.9 MG/DL (ref 0.6–1.3)
EOSINOPHIL NFR BLD: 5 % (ref 0–7)
ERYTHROCYTE [DISTWIDTH] IN BLOOD BY AUTOMATED COUNT: 13.6 % (ref 11.5–14.5)
GLUCOSE SERPL-MCNC: 293 MG/DL (ref 74–106)
HCT VFR BLD CALC: 38.2 % (ref 42–54)
HGB BLD-MCNC: 12.7 G/DL (ref 13.5–17.5)
LYMPHOCYTES # BLD: 1.7 10X3/UL (ref 1.32–3.57)
LYMPHOCYTES NFR BLD AUTO: 26.9 % (ref 15–50)
MCH RBC QN AUTO: 32.6 PG (ref 26–34)
MCHC RBC AUTO-ENTMCNC: 33.2 G/DL (ref 31–37)
MCV RBC: 98.2 FL (ref 80–100)
MONOCYTES NFR BLD: 10.9 % (ref 2–11)
NEUTROPHILS # BLD: 3.5 10X3/UL (ref 1.78–5.38)
NEUTROPHILS NFR BLD AUTO: 56.9 % (ref 40–80)
OSMOLALITY SERPL CALC.SUM OF ELEC: 281 MOSM/KG (ref 275–300)
PLATELET # BLD: 238 10X3/UL (ref 130–400)
PMV BLD AUTO: 8.1 FL (ref 7.4–10.4)
POTASSIUM SERPL-SCNC: 3.8 MMOL/L (ref 3.5–5.1)
RBC # BLD AUTO: 3.89 10X6/UL (ref 4.2–6.1)
SODIUM SERPL-SCNC: 136 MMOL/L (ref 136–145)
WBC # BLD AUTO: 6.2 10X3/UL (ref 4.8–10.8)

## 2021-05-28 NOTE — NUR
PATIENTN RECEIVED SITTING UP IN BED. ASSESSMENT & VITAL SIGNS DONE. BED LOW.
ALARMM ON. CALL LIGHT WITHIN REACH. WILL CONTINUE TO MONITOR.

## 2021-05-28 NOTE — NUR
PATIENT ASKED FOR ATIVAN FOR INSOMNIA. NOT GIVEN AT THIS TIME. PATIENT EYES
CLOSED & RESPIRATIONS 18 & EVEN. BED LOW. ALARM ON. CALL LIGHT WITHIN REACH.
WILL CONTINU ETO MONITOR.

## 2021-05-28 NOTE — NUR
PT INCONTINENT OF URINE. PLACED SOILED CLOTHES IN THE WASHER. PT REQUESTING A
BRIEF AND DOES NOT WANT TO WEAR A GOWN AT THIS TIME. HE DENIES FURTHER NEEDS
AT THIS TIME. HIS BED IS LOW AND CALL LIGHT IS WITHIN REACH.

## 2021-05-28 NOTE — NUR
PTS BLOOD SUGAR 408 PT EATING PEANUT BUTTER AND CRACKER BECAUSE HE SAID HE HAD
NOTHING BETTER TO DO. EDUCATED THAT THIS WAS GOING TO MAKE BLOOD SUGAR WORSE.
DR NELSON NOTIFED WILL GIVE PT 15 UNITS REGULAR INSULIN PER DRS ORDER ALSO NEW
SLIDING SCALE

## 2021-05-28 NOTE — NUR
PATIENT ADMITTED TO [facility] FROM ACUTE FLOOR. HIS PCP IS DR. ARCHULETA. AT DISCHARGE
THE PLANS ARE FOR HIM TO RETURN TO HIS HOME. WILL CONTINUE TO FOLLOW WITH
PATIENT.

## 2021-05-28 NOTE — NUR
PATIENT RECEIVED SITTING UP IN  BED. ASSESSMENT & VITAL SIGNS DONE. NO C/O
PAIN OR DISTRESS. BED LOW. ALARM ON. CALL LIGHT WITHIN REACH. WILL CONTINUE TO
MONITOR.

## 2021-05-29 VITALS — DIASTOLIC BLOOD PRESSURE: 67 MMHG | SYSTOLIC BLOOD PRESSURE: 126 MMHG

## 2021-05-29 VITALS — SYSTOLIC BLOOD PRESSURE: 152 MMHG | DIASTOLIC BLOOD PRESSURE: 87 MMHG

## 2021-05-29 NOTE — NUR
HE IS SLEEPING, I WOKE HIM UP TO TAKE HIS MEDICATIONS. HE HAS A BRUISE AND A
SCRAPE ON HIS RIGHT KNEE. HE IS WORKING WITH PT NOW.

## 2021-05-29 NOTE — NUR
PATIENT RECEIVED WITH ALARM SOUNDING. PATIENT HAD WALKED TO BATHROOM. VOID
ONLY. PATIENT REMINDED TO CALL FOR HELP WITH CALL LIGHT WITHIN REACH. PATIENT
IMPULSIVE, DOES NOT USE CALL LIGHT FOR HELP. PATIENT RETURNED TO LOW BED.
ALARM ON. ASSESSMENT & VITAL SIGNS DONE. CALL LIGHT WITHIN REACH. WILL
CONTINUE TO MONITOR.

## 2021-05-29 NOTE — NUR
PATIENT ALARM SOUNDING. PATIENT UP WALKING TOWARDS BATHROOM. THIS NURSE HAD
PATIENT TO SIT DOWN IN WHEELCHAIR. PATIENT UNSTEADY WHILE WALKING. PATIENT SAT
IN WHEELCHAIR PROPELED INTO BATHROOM. PATIENT STOOD UP IN FRONT OF COMMODE.
RAISED TOILET SEAT & URINATED. PATIENT SAT BACK DOWN IN WHEELCHAIR & RETURNED
TO LOW BED. ALARM ON. CALL LIGHT WITHIN REACH. PATIENT REMINDED TO USE URINALS
WITHIN REACH. WILL CONTINUE TO MONITOR.

## 2021-05-30 VITALS — SYSTOLIC BLOOD PRESSURE: 110 MMHG | DIASTOLIC BLOOD PRESSURE: 56 MMHG

## 2021-05-30 VITALS — DIASTOLIC BLOOD PRESSURE: 86 MMHG | SYSTOLIC BLOOD PRESSURE: 138 MMHG

## 2021-05-30 NOTE — NUR
HE IS SETTING UP ON THE SIDE OF THE BED. HE IS SETTING OFF THE BED ALARM. HE
USED THE WALKER TO GO INTO THE BATHROOM, HE DID NOT USE THE CALL LIGHT WHEN
FINISHED IN THE BATHROOM AS HE STATED HE WOULD. HE IS BACK IN BED WITH THE
CALL LIGHT WITHIN REACH AND THE BED ALARM IS ON.

## 2021-05-30 NOTE — NUR
PATIENT RECEIVED SITTING UP IN BED. ASSESSMENT & VITAL SIGNS DONE. BEDSIDE
TABLE & CALL LIGHT WITHIN REACH. ALARM ON. WILL CONTINUE TO MONITOR.

## 2021-05-31 VITALS — DIASTOLIC BLOOD PRESSURE: 79 MMHG | SYSTOLIC BLOOD PRESSURE: 129 MMHG

## 2021-05-31 VITALS — DIASTOLIC BLOOD PRESSURE: 74 MMHG | SYSTOLIC BLOOD PRESSURE: 115 MMHG

## 2021-05-31 LAB
ANION GAP SERPL CALC-SCNC: 10.1 MMOL/L (ref 8–16)
BASOPHILS NFR BLD AUTO: 0.5 % (ref 0–2)
BUN SERPL-MCNC: 8 MG/DL (ref 7–18)
CALCIUM SERPL-MCNC: 9.3 MG/DL (ref 8.5–10.1)
CHLORIDE SERPL-SCNC: 96 MMOL/L (ref 98–107)
CO2 SERPL-SCNC: 32.7 MMOL/L (ref 21–32)
CREAT SERPL-MCNC: 1 MG/DL (ref 0.6–1.3)
EOSINOPHIL NFR BLD: 2.2 % (ref 0–7)
ERYTHROCYTE [DISTWIDTH] IN BLOOD BY AUTOMATED COUNT: 13.2 % (ref 11.5–14.5)
GLUCOSE SERPL-MCNC: 202 MG/DL (ref 74–106)
HCT VFR BLD CALC: 39.7 % (ref 42–54)
HGB BLD-MCNC: 13.7 G/DL (ref 13.5–17.5)
LYMPHOCYTES # BLD: 1.8 10X3/UL (ref 1.32–3.57)
LYMPHOCYTES NFR BLD AUTO: 28.2 % (ref 15–50)
MCH RBC QN AUTO: 33.1 PG (ref 26–34)
MCHC RBC AUTO-ENTMCNC: 34.5 G/DL (ref 31–37)
MCV RBC: 96.1 FL (ref 80–100)
MONOCYTES NFR BLD: 16.6 % (ref 2–11)
NEUTROPHILS # BLD: 3.4 10X3/UL (ref 1.78–5.38)
NEUTROPHILS NFR BLD AUTO: 52.5 % (ref 40–80)
OSMOLALITY SERPL CALC.SUM OF ELEC: 275 MOSM/KG (ref 275–300)
PLATELET # BLD: 321 10X3/UL (ref 130–400)
PMV BLD AUTO: 7.9 FL (ref 7.4–10.4)
POTASSIUM SERPL-SCNC: 2.8 MMOL/L (ref 3.5–5.1)
RBC # BLD AUTO: 4.13 10X6/UL (ref 4.2–6.1)
SODIUM SERPL-SCNC: 136 MMOL/L (ref 136–145)
WBC # BLD AUTO: 6.5 10X3/UL (ref 4.8–10.8)

## 2021-05-31 NOTE — NUR
PATIENT ALARM SOUNDING. PATIENT GETTING UP OUT OF BED. ADJUSTING HIS SHORTS.
PATIENT LAID BACK DOWN. ALARM RESET. CALL LIGHT WITHIN REACH. WILL CONTINUE TO
MONITOR.

## 2021-05-31 NOTE — NUR
PATIENT EYES CLOSED. RESPIRATIONS 18 & EVEN. BED LOW. ALARM ON. CALL LIGHT &
BEDSIDE TABLE WITHIN REACH. WILL CONTINUE TO MONITOR.

## 2021-05-31 NOTE — NUR
WIFE PRICILA CALLED ABOUT PATIENT FOUND ON FLOOR AT END OF BED. NO INJURIES. WIFE
THANKED THIS NURSE.

## 2021-06-01 VITALS — SYSTOLIC BLOOD PRESSURE: 138 MMHG | DIASTOLIC BLOOD PRESSURE: 83 MMHG

## 2021-06-01 LAB
BILIRUB SERPL-MCNC: NEGATIVE MG/DL
KETONES UR STRIP-MCNC: NEGATIVE MG/DL
NITRITE UR-MCNC: NEGATIVE MG/ML
PH UR STRIP: 5 [PH] (ref 5–8)
UROBILINOGEN UR-MCNC: NORMAL MG/DL (ref ?–2)

## 2021-06-01 NOTE — NUR
Nutrition Re-Assessment:
 
Diet: Diabetic
PO intake: 100% x last 12 meals. States that his appetite is "very good."
Last BM: 5/29/21
Wt: 247# (5/28/21)
Meds noted: kdur, linzess, MVI, HCTZ, lantus, SSI, metformin
Labs noted: K 3.4(L), Glu 202(H), POC Glu 340(H)
 
Estimated nutrition needs: 1750-2450cal (25-35kcal/kg IBW), 70-91gms protein
(1-1.3gm/kg), 1750-2450mL fluid (or per MD)
 
Nutrition diagnosis: Altered nutrition related lab values r/t DM dx AEB
elevated blood glucose.
 
Nutrition goals:
-PO intake =/>75%  meals
-Meet fluid needs without fluid overload
-Dry weight stable DHS
-Blood glucose to trend down to nearer WNL
 
Recommendations/Interventions:
-Recommend continue current diet. Will continue to honor food preferences
within diet restrictions.
-Will continue to monitor PO intake and wt trend.
-RD will follow-up within 7 days.

## 2021-06-01 NOTE — NUR
ROUNDS MADE, PT RESTING WITH EYES CLOSED, RESP QUIET, NO DISTRESS NOTED, LEFT
UNDISTURBED AT THIS TIME

## 2021-06-01 NOTE — NUR
PT IS A 2 PERSON ASSIST TO STAND AND TRANSFER PT IS LEANING TO THE LEFT SIDE.
HE SAYS HIS BACK HURTS AND HIS LEFT KNEE WHICH IS INJURED FROM PRIOR TO
HOSPITILIAZTION. DR CHAND WILL CONTINUE TO MONITER

## 2021-06-01 NOTE — NUR
PT RESTING WITH EYES CLOSED. RESPIRATIONS EVEN AND UNLABORED. HIS BED ALARM IS
ON AND CALL LIGHT IS WITHIN REACH.

## 2021-06-01 NOTE — NUR
PT RESTING IN BED EATING SUPPER NEGATIVE UA PT STATES HE FEELS BETTER BUT HE
WAS CONFUSED ON THE TIME HE THOUGHT IT WAS 6-2-21. REORIENTED PT TO CORRECT
DATE AND TIME WILL REBA

## 2021-06-01 NOTE — NUR
ASSESSMENT PER FLOW SHEET, PT REPORTS BM YESTERDAY, ADM 2100 MEDS PER MD
ORDERS, SEE EMAR, SNACK PROVIDED, DENIES FURTHER NEEDS, FALL PRECAUTIONS IN
PLACE

## 2021-06-02 VITALS — DIASTOLIC BLOOD PRESSURE: 90 MMHG | SYSTOLIC BLOOD PRESSURE: 167 MMHG

## 2021-06-02 VITALS — SYSTOLIC BLOOD PRESSURE: 116 MMHG | DIASTOLIC BLOOD PRESSURE: 65 MMHG

## 2021-06-02 LAB
ANION GAP SERPL CALC-SCNC: 12.1 MMOL/L (ref 8–16)
BASOPHILS NFR BLD AUTO: 0.5 % (ref 0–2)
BUN SERPL-MCNC: 12 MG/DL (ref 7–18)
CALCIUM SERPL-MCNC: 8.7 MG/DL (ref 8.5–10.1)
CHLORIDE SERPL-SCNC: 97 MMOL/L (ref 98–107)
CO2 SERPL-SCNC: 29.7 MMOL/L (ref 21–32)
CREAT SERPL-MCNC: 1 MG/DL (ref 0.6–1.3)
EOSINOPHIL NFR BLD: 1.4 % (ref 0–7)
ERYTHROCYTE [DISTWIDTH] IN BLOOD BY AUTOMATED COUNT: 13.2 % (ref 11.5–14.5)
GLUCOSE SERPL-MCNC: 280 MG/DL (ref 74–106)
HCT VFR BLD CALC: 35.9 % (ref 42–54)
HGB BLD-MCNC: 12.4 G/DL (ref 13.5–17.5)
LYMPHOCYTES NFR BLD AUTO: 25.5 % (ref 15–50)
MCH RBC QN AUTO: 33.1 PG (ref 26–34)
MCHC RBC AUTO-ENTMCNC: 34.5 G/DL (ref 31–37)
MCV RBC: 96 FL (ref 80–100)
MONOCYTES NFR BLD: 13.3 % (ref 2–11)
NEUTROPHILS # BLD: 4.6 10X3/UL (ref 1.78–5.38)
NEUTROPHILS NFR BLD AUTO: 59.3 % (ref 40–80)
OSMOLALITY SERPL CALC.SUM OF ELEC: 279 MOSM/KG (ref 275–300)
PLATELET # BLD: 332 10X3/UL (ref 130–400)
PMV BLD AUTO: 8.1 FL (ref 7.4–10.4)
POTASSIUM SERPL-SCNC: 3.8 MMOL/L (ref 3.5–5.1)
RBC # BLD AUTO: 3.74 10X6/UL (ref 4.2–6.1)
SODIUM SERPL-SCNC: 135 MMOL/L (ref 136–145)
WBC # BLD AUTO: 7.7 10X3/UL (ref 4.8–10.8)

## 2021-06-02 NOTE — NUR
PT RESTING WITH EYES CLOSED, AROUSES TO SOFT VERBAL STIMULATION, OBTAINED
FSBS, ADM 0600/0700 MEDS PER MD ORDERS, SEE EMAR, PT UP TO BR VIA WC WITH
ASSISTANCE, VOIDED BY SELF WITH NO DIFFICULTY, PT BACK TO BED, DENIES FURTHER
NEEDS, FALL PRECAUTIONS IN PLACE

## 2021-06-02 NOTE — NUR
CARE TEAM MEETING:
PATIENT IS PROGRESSING SOME IN THERAPY. SPOKE WITH HIS SPOUSE AND SHE ASK THAT
DR. NELSON TO CALL HER THAT SHE HAS QUESTIONS. PATIENT TENATIVE DC DATE IS
6/8/21. WILL CONTINUE TO FOLLOW WITH PATIENT.

## 2021-06-02 NOTE — NUR
RESTING IN BED, NO DISTRESS NOTED, CONT TO MONITOR BLOOD SUGARS, S/S GIVEN
ALONG WITH PAIN MED THIS PM, BED ALARM ON, PT VERY IMPULSIVE

## 2021-06-03 VITALS — DIASTOLIC BLOOD PRESSURE: 85 MMHG | SYSTOLIC BLOOD PRESSURE: 129 MMHG

## 2021-06-03 VITALS — DIASTOLIC BLOOD PRESSURE: 72 MMHG | SYSTOLIC BLOOD PRESSURE: 120 MMHG

## 2021-06-03 NOTE — NUR
AWAKE AND ALERT. CONFUSED TO TIME AND SITUATION. UP AND DOWN FROM BED TO
WHEELCHAIR WITHOUT CALLING FOR ASSISTANCE. EDUCATION DONE ON CALLING FOR
ASSISTANCE BEFORE GETTING UP. STATES OK. NOW RESTING IN BED WITH CALL LIGHT IN
REACH.

## 2021-06-03 NOTE — RHP
PATIENT: GRACE MAZARIEGOS                           MEDICAL RECORD: [id number]
ACCOUNT: [id number]                                    LOCATION:[id number]
: [birth year]                                            ADMISSION DATE: 21
                                                         
 
                     REHABILITATION HISTORY AND PHYSICAL EXAMINATION
                         POST ADMISSION PHYSICIAN EXAMINATION
 
 
POST ADMISSION PHYSICAL EXAMINATION AND HISTORY AND PHYSICAL
 
ADMITTING DIAGNOSIS:  Acute metabolic encephalopathy.
 
HISTORY OF PRESENT ILLNESS:  The patient is a 65-year-old gentleman who is
followed by Dr. Watson.  The patient apparently had a group go out and do an
evaluation at home.  When I attempted to contact him, it was noted he had been
admitted to the hospital.  He presented to the ED on 2021 with abdominal
pain.  He apparently had just recently quit drinking.  They gave him Ativan and
Librium.  He was sedated.  Initial labs showed an elevated lipase.  He had acute
pancreatitis.  He apparently uses a nicotine via smokeless tobacco use daily,
drinks approximately 1 pint per day, but he denied any illicit drug use.  He was
admitted to medical floor for further monitoring and evaluation and treatment. 
The patient was placed on full liquid diet and was advanced.  He has been on DT
prophylaxis precautions, fall precautions, and telemetry.  He has had some
tachycardia.  He has had anxiety and shakiness, but is improved.  It was
discussed with him at length that he is in need for medication and treatment. 
He has had 2 falls since his admission to acute care.  He has decreased safety
awareness.  Prior to the falls at home, he had fractured his clavicle on
2021.  Otherwise, he has been independent with ADL and ambulation.  He
lives with his wife.  He is currently on min to max assist for ADLs, min to max
assist for ambulation with rest breaks.  We are monitoring his lab values
closely, his cognition, medication adjustment, decreased activity tolerance,
decreased strength, balance deficit, gait disturbance, impaired mobility,
dyspnea on exertion, high fall risk, and self-care deficits.  These are all
barriers to his discharge home.  He will require intensive therapy to get back
to level of function that he can return home.  Comorbidities include acute
pancreatitis, osteoarthritis, nicotine dependent, and chronic kidney disease.
 
PAST MEDICAL HISTORY:  Significant for hypertension, dyslipidemia, diabetes,
neuropathy, chronic kidney disease, obstructive sleep apnea, glaucoma,
gastroesophageal reflux disease, and osteoarthritis.
 
PAST SURGICAL HISTORY:  Includes a joint replacement.  He has also had cataract
surgery and back surgery.
 
ALLERGIES:  No known drug allergies.
 
CURRENT MEDICATIONS:  Include a Nicoderm patch daily.  He is on a multivitamin
daily.  He is on hydrochlorothiazide 25 mg daily, Neurontin 300 mg daily,
aspirin 81 mg daily, ascorbic acid 250 b.i.d.  He is on Protonix 40 mg daily,
insulin he is on 60 units b.i.d.  He is on Trusopt eye drops b.i.d., Librium 50
mg t.i.d., Lipitor 40 mg at bedtime, Zofran 4 mg q.4 hours p.r.n., MiraLax 17
grams in 8 ounces of water daily.  He is on a low resistance sliding scale of
insulin, Ativan 0.5 mg q. 4 hours p.r.n., metformin 1000 mg b.i.d. with meals,
Norco 10/325 one tab q.4 hours p.r.n., Apresoline needed for elevated blood
pressure.  He is on Benadryl 25 mg q.6 hours, vitamin D 1000 units daily,
Ventolin updrafts as needed, and Tylenol 500 mg q.6 hours p.r.n.
 
 
 
 
HISTORY AND PHYSICAL                           [id number]    GRACE MAZARIEGOS   
 
 
HABITS:  He does have a history of alcohol and tobacco use.
 
FAMILY HISTORY:  Noncontributory.
 
SOCIAL HISTORY:  The patient hopes to return back home with his wife and get
back to his prior level of functioning.
 
REVIEW OF SYSTEMS:
GENERAL:  He does complain of weakness and fatigue.
HEENT:  Denies cold, cough, or congestion.
CARDIOVASCULAR:  Denies any chest pain.
 
PHYSICAL EXAMINATION:
VITAL SIGNS:  Stable, afebrile.
GENERAL:  A morbidly obese gentleman in no acute distress upon exam.
HEENT:  Normocephalic and atraumatic.  Mucosa moist.
NECK:  Supple with no lymphadenopathy.
LUNGS:  Clear at this time.  No wheezing or rales.
HEART:  Regular rhythm.  No murmurs, rubs, or gallops.
ABDOMEN:  Soft, benign, nondistended, and obese.
EXTREMITIES:  No clubbing, cyanosis, or edema.
NEUROLOGIC:  He does have some noted problems with balance.
 
LABORATORY DATA:  White count 6.2, H&H of 12 and 38, and platelet count is 238. 
Sodium 136, potassium 3.8, BUN and creatinine of 11 and 0.9 and blood sugar is
noted to be 293.
 
ASSESSMENT:  This is a 65-year-old gentleman admitted to the rehab with a
working diagnosis of acute metabolic encephalopathy.  The patient has potential
to make improvement.  We instituted the following multidisciplinary therapies
including, not limited to physical, occupational, respiratory, speech,
nutritional services, prosthetics and orthotics.  Given his complex medical
condition and risks for more complications, rehabilitation services cannot be
provided at a low level of care such as skilled nurse facility.
 
PLAN:
1.  Admit to [facility] for inpatient therapy to include the following
disciplines:
A. Physical therapy to improve gait, all transfer skills and bed mobility to a
modified independent level.
B.  Occupational therapy to improve activities of daily living.
C.  Case management to help with discharge planning and placement options.
D.  Nutrition to assist with nutritional needs.
E.  Rehabilitation nursing to assist monitoring the patient's underlying medical
conditions and to assist with any type of bowel or bladder management.
2.  The patient's current medication and medical care will be continued.
3.  He will be placed on standard fall precautions.
4.  The patient's estimated length of stay is approximately 7-10 days.
5.  We will discuss this patient during care team staff meeting this week.  We
will watch him closely for any signs of DT.  Continue on Librium and Ativan
p.r.n. and I will probably see again in the a.m.
 
TRANSINT:[id number] Voice Confirmation ID: [id number] DOCUMENT ID: [id number]
 
 
 
 
 
HISTORY AND PHYSICAL                           [id number]    GRACE MAZARIEGOS notes whether there has been none or any medical/functional
change since admission:
- No change since preadmission screen.                                  
 
 
MAYRA attests patient continues to be appropriate for IRF:
- Continues to be appropriate.                                          
 
 
                                           
                                           ANIA NELSON MD           
 
 
 
Electronically Signed by ANIA NELSON on 21 at 1944
 
 
 
 
 
 
 
 
 
 
 
 
 
 
 
 
 
 
 
 
 
 
 
 
 
 
 
 
 
 
 
 
CC:                                                             [id number]
DICTATION DATE: 21     :     21 0946      ADM IN  
                                                                              
[facility]                                          
[address] [id number] [address]: Preventive Care And Screening: Tobacco Use: Screening And Cessation Intervention: Patient screened for tobacco use and is an ex/non-smoker Detail Level: Zone

## 2021-06-04 VITALS — SYSTOLIC BLOOD PRESSURE: 125 MMHG | DIASTOLIC BLOOD PRESSURE: 89 MMHG

## 2021-06-04 VITALS — DIASTOLIC BLOOD PRESSURE: 72 MMHG | SYSTOLIC BLOOD PRESSURE: 128 MMHG

## 2021-06-04 LAB
ANION GAP SERPL CALC-SCNC: 11.3 MMOL/L (ref 8–16)
BASOPHILS NFR BLD AUTO: 0.7 % (ref 0–2)
BUN SERPL-MCNC: 10 MG/DL (ref 7–18)
CALCIUM SERPL-MCNC: 8.8 MG/DL (ref 8.5–10.1)
CHLORIDE SERPL-SCNC: 99 MMOL/L (ref 98–107)
CO2 SERPL-SCNC: 31.2 MMOL/L (ref 21–32)
CREAT SERPL-MCNC: 0.8 MG/DL (ref 0.6–1.3)
EOSINOPHIL NFR BLD: 4.2 % (ref 0–7)
ERYTHROCYTE [DISTWIDTH] IN BLOOD BY AUTOMATED COUNT: 13.2 % (ref 11.5–14.5)
GLUCOSE SERPL-MCNC: 99 MG/DL (ref 74–106)
HCT VFR BLD CALC: 35.8 % (ref 42–54)
HGB BLD-MCNC: 12.2 G/DL (ref 13.5–17.5)
LYMPHOCYTES NFR BLD AUTO: 25.1 % (ref 15–50)
MCH RBC QN AUTO: 32.8 PG (ref 26–34)
MCHC RBC AUTO-ENTMCNC: 34 G/DL (ref 31–37)
MCV RBC: 96.4 FL (ref 80–100)
MONOCYTES NFR BLD: 8.7 % (ref 2–11)
NEUTROPHILS # BLD: 4.5 10X3/UL (ref 1.78–5.38)
NEUTROPHILS NFR BLD AUTO: 61.3 % (ref 40–80)
OSMOLALITY SERPL CALC.SUM OF ELEC: 274 MOSM/KG (ref 275–300)
PLATELET # BLD: 408 10X3/UL (ref 130–400)
PMV BLD AUTO: 8.2 FL (ref 7.4–10.4)
POTASSIUM SERPL-SCNC: 3.5 MMOL/L (ref 3.5–5.1)
RBC # BLD AUTO: 3.71 10X6/UL (ref 4.2–6.1)
SODIUM SERPL-SCNC: 138 MMOL/L (ref 136–145)
WBC # BLD AUTO: 7.3 10X3/UL (ref 4.8–10.8)

## 2021-06-04 NOTE — NUR
LAYING DOWN IN BED RESTING QUIETLY. FALLS ASLEEP QUICKLY IN JUST ABOUT ANY
POSITION. IS STILL IMPULSIVE TO GET UP BY SELF AND TRY TO WALK IN ROOM. HE IS
UNSTEADY. BED AND CHAIR ALARM IN PLACE FOR SAFETY. HE HAS LARGE ABD BUT CAN
VOID BY SELF. CALL LIGHT IN REACH

## 2021-06-04 NOTE — NUR
AWAKE AND RESTING IN BED.  UP AND DOWN ALL THIS SHIFT. REMAINS CONFUSED AND
IMPULSIVE. NO ACUTE DISTRESS NOTED.

## 2021-06-05 VITALS — DIASTOLIC BLOOD PRESSURE: 63 MMHG | SYSTOLIC BLOOD PRESSURE: 115 MMHG

## 2021-06-05 VITALS — DIASTOLIC BLOOD PRESSURE: 66 MMHG | SYSTOLIC BLOOD PRESSURE: 126 MMHG

## 2021-06-05 NOTE — NUR
PATIENT USED CALL LIGHT FOR ASSIST. PATIENT NEEDED HIS ATIVAN. IT WAS GIVEN
FOR ANXIETY. PATIENT HAD A WET BRIEF. NEW BRIEF ON, NO ASSIST. ALARM ON.
PATIENT LAYING IN BED. SCHUYLER CONTINUE TO MONITOR.

## 2021-06-05 NOTE — NUR
PATIENT ALARM BEEPED & STOPPED. PATIENT FOUND SITTING AT THE SIDE OF THE END
OF THE BED. THIS NURSE WATCHED PATIENT MOVE UP INTO BED ON ALARM PAD. PAD
BEEPED. ALARM GREEN FLASHING. CALL LIGHT WITHIN REACH. WILL CONTINUE TO
MONITOR.

## 2021-06-05 NOTE — NUR
PATIENT AWAKE ALARM ON & SOUNDING. PATIENT WENT INTO BATHROOM. VOID ONLY.
RETURNED TO LOW BED. ALARM ON. CALL LIGHT WITHIN REACH. WILL CONTINUE TO
MONITOR.

## 2021-06-05 NOTE — NUR
PATIENT ALARM SOUNDING. PATIENT UP OUT OF BED, AMBULATING W/O WALKER TO
BATHROOM. VOID ONLY. RETURNED TO LOW BED. OLD BRIEF OFF & CLEAN BRIEF ON BY
PATIENT. ALARM ON. CALL LIGHT & BEDSIDE TABLE WITHIN REACH. WILL CONTINUE TO
MONITOR.

## 2021-06-05 NOTE — NUR
PATIENT RECEIVED SITTING UP IN BED. ASSESSMENT & VITAL SIGNS DONE. NO C/O PAIN
OR DISTRESS. BED LOW. ALARM ON. BEDSIDE TABLE WITHIN REACH. WILL CONTINUE TO
MONITOR.

## 2021-06-06 VITALS — SYSTOLIC BLOOD PRESSURE: 121 MMHG | DIASTOLIC BLOOD PRESSURE: 58 MMHG

## 2021-06-06 VITALS — SYSTOLIC BLOOD PRESSURE: 146 MMHG | DIASTOLIC BLOOD PRESSURE: 88 MMHG

## 2021-06-06 NOTE — NUR
PATIENT RECEIVED SITTING ON SIDE OF BED. ASSESSMENT & VITAL SIGNS DONE. NO C/O
PAIN OR DISTRESS AT THIS TIME. ALARM ON. CALL LIGHT & BEDSIDE TABLE WITHIN
REACH. WILL CONTINUE TO MONITOR.

## 2021-06-06 NOTE — NUR
PATIENT EYES CLOSED. RESPIRATIONS 18 & EVEN. BED LOW. ALARM ON. BEDSIDE TABLE
& CALL LIGHT WITHIN REACH. WILL CONTINUE TO MONITOR.

## 2021-06-07 VITALS — SYSTOLIC BLOOD PRESSURE: 131 MMHG | DIASTOLIC BLOOD PRESSURE: 81 MMHG

## 2021-06-07 VITALS — DIASTOLIC BLOOD PRESSURE: 90 MMHG | SYSTOLIC BLOOD PRESSURE: 151 MMHG

## 2021-06-07 LAB
ANION GAP SERPL CALC-SCNC: 8.6 MMOL/L (ref 8–16)
BASOPHILS NFR BLD AUTO: 1 % (ref 0–2)
BUN SERPL-MCNC: 10 MG/DL (ref 7–18)
CALCIUM SERPL-MCNC: 9.5 MG/DL (ref 8.5–10.1)
CHLORIDE SERPL-SCNC: 101 MMOL/L (ref 98–107)
CO2 SERPL-SCNC: 32.3 MMOL/L (ref 21–32)
CREAT SERPL-MCNC: 0.9 MG/DL (ref 0.6–1.3)
EOSINOPHIL NFR BLD: 3.9 % (ref 0–7)
ERYTHROCYTE [DISTWIDTH] IN BLOOD BY AUTOMATED COUNT: 13.1 % (ref 11.5–14.5)
GLUCOSE SERPL-MCNC: 209 MG/DL (ref 74–106)
HCT VFR BLD CALC: 38.3 % (ref 42–54)
HGB BLD-MCNC: 12.9 G/DL (ref 13.5–17.5)
LYMPHOCYTES # BLD: 2.2 10X3/UL (ref 1.32–3.57)
LYMPHOCYTES NFR BLD AUTO: 30.3 % (ref 15–50)
MCH RBC QN AUTO: 32.3 PG (ref 26–34)
MCHC RBC AUTO-ENTMCNC: 33.7 G/DL (ref 31–37)
MCV RBC: 96 FL (ref 80–100)
MONOCYTES NFR BLD: 8.7 % (ref 2–11)
NEUTROPHILS # BLD: 4 10X3/UL (ref 1.78–5.38)
NEUTROPHILS NFR BLD AUTO: 56.1 % (ref 40–80)
OSMOLALITY SERPL CALC.SUM OF ELEC: 280 MOSM/KG (ref 275–300)
PLATELET # BLD: 533 10X3/UL (ref 130–400)
PMV BLD AUTO: 8 FL (ref 7.4–10.4)
POTASSIUM SERPL-SCNC: 3.9 MMOL/L (ref 3.5–5.1)
RBC # BLD AUTO: 4 10X6/UL (ref 4.2–6.1)
SODIUM SERPL-SCNC: 138 MMOL/L (ref 136–145)
WBC # BLD AUTO: 7.2 10X3/UL (ref 4.8–10.8)

## 2021-06-07 NOTE — NUR
HE IS LYING ON HIE LEFT SIDE, AROUSES TO MY VOICE. HE IS ASKING ABOUT
BREAKFAST. HE TOOK HIS MEDICAITONS WITHOUT ANY PROBLEMS. THE CALL LIGHT IS
WITHIN REACH AND THE BED ALARM IS ON.

## 2021-06-07 NOTE — NUR
BEDSIDE REPORT COMPLETE. RECEIVED PT SITTING UP ON SIDE OF BED. ALERT AND
ORIENTED X3. CONFUSION AT TIMES. DENIES ANY NEED OR PAIN. NO DISTRESS NOTED.
TELEMTRY INTACT RUNNING SINUS RHYTHM. PT CAN BE IMPULSIVE, POSEY ALARM ON.
CALL LIGHT AND WATER WITHIN REACH. CPOC

## 2021-06-07 NOTE — NUR
PATIENT DISCHARGING WITH SPOUSE IN AM, 6/8/21. PATIENT DECLINES HOME HEALTH AT
THIS TIME. SARITA SIGNED, IMM SERVED AND EXPLAINED ONE GIVEN TO PATIENT AND ONE
FILED IN CHART. NO NEW DME NEEDED AT THIS TIME.DR. ARCHULETA/MICHELLE 6/15/21 @
10:00. DISCHARGE INSTRUCTIONS FAXED TO PCP AND REVIEWED WITH PATIENT AND
SPOUSE. WILL CONTINUE TO FOLLOW WITH PATIENT UNTIL DISCHARGED.

## 2021-06-08 VITALS — SYSTOLIC BLOOD PRESSURE: 131 MMHG | DIASTOLIC BLOOD PRESSURE: 82 MMHG

## 2021-06-08 NOTE — NUR
HE IS SETTING ON THE SIDE OF THE BED, ASKING ABOUT GOING HOME. HE IS ASKING
QUESTIONS ABOUT HIS INSULIN, I TOLD HIM TO ASK HIS PCP ABOUT IT AT HIS FOLLOW
UP APPOINTMENT.

## 2021-06-08 NOTE — NUR
PT LYING IN BED ON LEFT SIDE AWAKE. DENIES ANY PAIN OR NEEDS. FSBS 217. NO
ACUTE CHANGES IN CONDITION THIS SHIFT. CALL LIGHT WITHIN REACH. POSEY ALARM ON

## 2021-06-08 NOTE — NUR
PAPERWORK GONE OVER WITH THE PATIENT AND HIS WIFE, QUESTIONS ANSWERED. HE HAS
A WRITTEN PRESCRITION FOR ADOLFO WITH THEM. HIS WIFE IS AT THE BEDSIDE. HE WAS
TAKEN OUT VIA WHEEL CHAIR TO THE FRONT DOOR.

## 2023-05-03 NOTE — NUR
Patient Name: GRACE MAZARIEGOS
Admission Status: Elective
Accout number: [id number]
Admission Date: 2017
: 1955
Admission Diagnosis:
Attending: CHARLEY PACHECO
Current LOS: 1
 
Anticipated DC Date:
Planned Disposition: Home
Primary Insurance: MEDICARE A & B
 
 
Discharge Planning Comments:
CM met with patient to assess discharge planning needs. Patient lives
independently at home with his wife (Mary Ellen) where he plans to return too.
Patient has a bedside commode and a walker, he will need a CPM and that has
been set up per patient for the machine to be delivered to the house. Patient
stated that he has 5 steps to enter in his house. Patient would like to use
[facility] for his therapy, CM will set this up prior to DC. CM will continue
to discharge planning as needed.
 
 
[facility] [place]
Mary Ellen (WIFE) [phone]
 
 
 
 
 
: Julianne Hurtado
 
* Is the patient Alert and Oriented? Yes  0
* How many steps to enter\exit or inside your home? 5  0
* [facility]  [facility]
* Pharmacy [facility] in [place]  0
* Preadmission Environment Home with Family  0
* ADLs Independent  0
* Equipment Bedside Commode
Rolling Walker  0
* List name and contact numbers for known caregivers / representatives who
currently or will assist patient after discharge: Mary Ellen (wife) [phone]  0
 
* Community resources currently utilized None  0
* Additional services required to return to the preadmission environment? Yes
0
* Can the patient safely return to the preadmission environment? Yes  0
* Has this patient been hospitalized within the prior 30 days at any hospital?
No  0
    Grand Total:  0 Patient given a granola bar and orange juice with her steroids.       Brent Soas, [place]  05/03/23 9345